# Patient Record
Sex: FEMALE | Race: WHITE | NOT HISPANIC OR LATINO | Employment: UNEMPLOYED | URBAN - METROPOLITAN AREA
[De-identification: names, ages, dates, MRNs, and addresses within clinical notes are randomized per-mention and may not be internally consistent; named-entity substitution may affect disease eponyms.]

---

## 2017-04-19 ENCOUNTER — APPOINTMENT (OUTPATIENT)
Dept: RADIOLOGY | Facility: MEDICAL CENTER | Age: 52
End: 2017-04-19
Attending: EMERGENCY MEDICINE
Payer: MEDICAID

## 2017-04-19 ENCOUNTER — HOSPITAL ENCOUNTER (OUTPATIENT)
Facility: MEDICAL CENTER | Age: 52
End: 2017-04-20
Attending: EMERGENCY MEDICINE | Admitting: ORTHOPAEDIC SURGERY
Payer: MEDICAID

## 2017-04-19 DIAGNOSIS — S52.372A CLOSED GALEAZZI'S FRACTURE OF LEFT RADIUS, INITIAL ENCOUNTER: ICD-10-CM

## 2017-04-19 PROCEDURE — G0378 HOSPITAL OBSERVATION PER HR: HCPCS

## 2017-04-19 PROCEDURE — 73110 X-RAY EXAM OF WRIST: CPT | Mod: LT

## 2017-04-19 PROCEDURE — A9270 NON-COVERED ITEM OR SERVICE: HCPCS | Performed by: ORTHOPAEDIC SURGERY

## 2017-04-19 PROCEDURE — 96375 TX/PRO/DX INJ NEW DRUG ADDON: CPT

## 2017-04-19 PROCEDURE — 700111 HCHG RX REV CODE 636 W/ 250 OVERRIDE (IP): Performed by: EMERGENCY MEDICINE

## 2017-04-19 PROCEDURE — 73090 X-RAY EXAM OF FOREARM: CPT | Mod: LT

## 2017-04-19 PROCEDURE — 96374 THER/PROPH/DIAG INJ IV PUSH: CPT

## 2017-04-19 PROCEDURE — 99285 EMERGENCY DEPT VISIT HI MDM: CPT

## 2017-04-19 PROCEDURE — 73130 X-RAY EXAM OF HAND: CPT | Mod: LT

## 2017-04-19 PROCEDURE — 700102 HCHG RX REV CODE 250 W/ 637 OVERRIDE(OP): Performed by: ORTHOPAEDIC SURGERY

## 2017-04-19 PROCEDURE — 73080 X-RAY EXAM OF ELBOW: CPT | Mod: LT

## 2017-04-19 RX ORDER — OXYCODONE HYDROCHLORIDE 5 MG/1
5 TABLET ORAL
Status: DISCONTINUED | OUTPATIENT
Start: 2017-04-19 | End: 2017-04-20 | Stop reason: HOSPADM

## 2017-04-19 RX ORDER — ACETAMINOPHEN 500 MG
1000 TABLET ORAL EVERY 6 HOURS
Status: DISCONTINUED | OUTPATIENT
Start: 2017-04-19 | End: 2017-04-20

## 2017-04-19 RX ORDER — OXYCODONE HYDROCHLORIDE 10 MG/1
10 TABLET ORAL
Status: DISCONTINUED | OUTPATIENT
Start: 2017-04-19 | End: 2017-04-20 | Stop reason: HOSPADM

## 2017-04-19 RX ORDER — MORPHINE SULFATE 4 MG/ML
4 INJECTION, SOLUTION INTRAMUSCULAR; INTRAVENOUS ONCE
Status: COMPLETED | OUTPATIENT
Start: 2017-04-19 | End: 2017-04-19

## 2017-04-19 RX ORDER — ONDANSETRON 2 MG/ML
4 INJECTION INTRAMUSCULAR; INTRAVENOUS ONCE
Status: COMPLETED | OUTPATIENT
Start: 2017-04-19 | End: 2017-04-19

## 2017-04-19 RX ADMIN — ONDANSETRON 4 MG: 2 INJECTION INTRAMUSCULAR; INTRAVENOUS at 19:59

## 2017-04-19 RX ADMIN — MORPHINE SULFATE 4 MG: 4 INJECTION INTRAVENOUS at 19:59

## 2017-04-19 RX ADMIN — HYDROMORPHONE HYDROCHLORIDE 1 MG: 1 INJECTION, SOLUTION INTRAMUSCULAR; INTRAVENOUS; SUBCUTANEOUS at 21:43

## 2017-04-19 RX ADMIN — ACETAMINOPHEN 1000 MG: 500 TABLET, FILM COATED ORAL at 22:01

## 2017-04-19 ASSESSMENT — PAIN SCALES - GENERAL
PAINLEVEL_OUTOF10: 8
PAINLEVEL_OUTOF10: 4

## 2017-04-19 ASSESSMENT — LIFESTYLE VARIABLES: DO YOU DRINK ALCOHOL: NO

## 2017-04-19 NOTE — IP AVS SNAPSHOT
" Home Care Instructions                                                                                                                  Name:Doris Tate  Medical Record Number:[id number]  CSN: [id number]    YOB: 1965   Age: 52 y.o.  Sex: female  HT:1.727 m (5' 7.99\") WT: 77 kg (169 lb 12.1 oz)          Admit Date: 4/19/2017     Discharge Date:   Today's Date: 4/20/2017  Attending Doctor:  Cortez Mccall M.D.                  Allergies:  Review of patient's allergies indicates no known allergies.            Discharge Instructions       Discharge Instructions    Discharged to home by car with friend. Discharged via wheelchair, hospital escort: Yes.  Special equipment needed: Not Applicable    Be sure to schedule a follow-up appointment with your primary care doctor or any specialists as instructed.     Discharge Plan:   Diet Plan: Discussed  Activity Level: Discussed  Smoking Cessation Offered: Patient Refused  Confirmed Follow up Appointment: Patient to Call and Schedule Appointment  Confirmed Symptoms Management: Discussed  Medication Reconciliation Updated: Yes  Influenza Vaccine Indication: Patient Refuses    I understand that a diet low in cholesterol, fat, and sodium is recommended for good health. Unless I have been given specific instructions below for another diet, I accept this instruction as my diet prescription.   Other diet: regular      Special Instructions: Discharge instructions for the Orthopedic Patient    Follow up with Primary Care Physician within 2 weeks of discharge to home, regarding:  Review of medications and diagnostic testing.  Surveillance for medical complications.  Workup and treatment of osteoporosis, if appropriate.     -Is this a Joint Replacement patient? No    -Is this patient being discharged with medication to prevent blood clots?  No    · Is patient discharged on Warfarin / Coumadin?   No     · Is patient Post Blood Transfusion?  No    Depression / Suicide Risk    As " you are discharged from this [facility] facility, it is important to learn how to keep safe from harming yourself.    Recognize the warning signs:  · Abrupt changes in personality, positive or negative- including increase in energy   · Giving away possessions  · Change in eating patterns- significant weight changes-  positive or negative  · Change in sleeping patterns- unable to sleep or sleeping all the time   · Unwillingness or inability to communicate  · Depression  · Unusual sadness, discouragement and loneliness  · Talk of wanting to die  · Neglect of personal appearance   · Rebelliousness- reckless behavior  · Withdrawal from people/activities they love  · Confusion- inability to concentrate     If you or a loved one observes any of these behaviors or has concerns about self-harm, here's what you can do:  · Talk about it- your feelings and reasons for harming yourself  · Remove any means that you might use to hurt yourself (examples: pills, rope, extension cords, firearm)  · Get professional help from the community (Mental Health, Substance Abuse, psychological counseling)  · Do not be alone:Call your Safe Contact- someone whom you trust who will be there for you.  · Call your local CRISIS HOTLINE 181-0608 or 112-699-3221  · Call your local Children's Mobile Crisis Response Team [place] (526) 247-5182 or www.[employer]  · Call the toll free National Suicide Prevention Hotlines   · National Suicide Prevention Lifeline 401-050-ZGFE (9162)  · National Hope Line Network 800-SUICIDE (278-3686)        Follow-up Information     1. Follow up with Cortez Mccall M.D.. Schedule an appointment as soon as possible for a visit on 5/1/2017.    Specialty:  Orthopaedics    Why:  For suture removal    Contact information    [address]  [address]  [place] [address]  [phone]           Discharge Medication Instructions:    Below are the medications your physician expects you to take upon discharge:    Review  all your home medications and newly ordered medications with your doctor and/or pharmacist. Follow medication instructions as directed by your doctor and/or pharmacist.    Please keep your medication list with you and share with your physician.               Medication List      START taking these medications        Instructions    Morning Afternoon Evening Bedtime    oxycodone immediate-release 5 MG Tabs   Last time this was given:  5 mg on 4/20/2017  4:39 AM   Commonly known as:  ROXICODONE        Take 1-2 Tabs by mouth every four hours as needed (Moderate Pain (NRS Pain Scale 4-6; CPOT Pain Scale 3-5)).   Dose:  5-10 mg                          CONTINUE taking these medications        Instructions    Morning Afternoon Evening Bedtime    ibuprofen 200 MG Tabs   Commonly known as:  MOTRIN        Take 400 mg by mouth every 6 hours as needed for Mild Pain. Indications: over the counter   Dose:  400 mg                             Where to Get Your Medications      You can get these medications from any pharmacy     Bring a paper prescription for each of these medications    - oxycodone immediate-release 5 MG Tabs            Instructions           Diet / Nutrition:    Follow any diet instructions given to you by your doctor or the dietician, including how much salt (sodium) you are allowed each day.    If you are overweight, talk to your doctor about a weight reduction plan.    Activity:    Remain physically active following your doctor's instructions about exercise and activity.    Rest often.     Any time you become even a little tired or short of breath, SIT DOWN and rest.    Worsening Symptoms:    Report any of the following signs and symptoms to the doctor's office immediately:    *Pain of jaw, arm, or neck  *Chest pain not relieved by medication                               *Dizziness or loss of consciousness  *Difficulty breathing even when at rest   *More tired than usual                                          *Bleeding drainage or swelling of surgical site  *Swelling of feet, ankles, legs or stomach                 *Fever (>100ºF)  *Pink or blood tinged sputum  *Weight gain (3lbs/day or 5lbs /week)           *Shock from internal defibrillator (if applicable)  *Palpitations or irregular heartbeats                *Cool and/or numb extremities    Stroke Awareness    Common Risk Factors for Stroke include:    Age  Atrial Fibrillation  Carotid Artery Stenosis  Diabetes Mellitus  Excessive alcohol consumption  High blood pressure  Overweight   Physical inactivity  Smoking    Warning signs and symptoms of a stroke include:    *Sudden numbness or weakness of the face, arm or leg (especially on one side of the body).  *Sudden confusion, trouble speaking or understanding.  *Sudden trouble seeing in one or both eyes.  *Sudden trouble walking, dizziness, loss of balance or coordination.Sudden severe headache with no known cause.    It is very important to get treatment quickly when a stroke occurs. If you experience any of the above warning signs, call 911 immediately.                   Disclaimer         Quit Smoking / Tobacco Use:    I understand the use of any tobacco products increases my chance of suffering from future heart disease or stroke and could cause other illnesses which may shorten my life. Quitting the use of tobacco products is the single most important thing I can do to improve my health. For further information on smoking / tobacco cessation call a Toll Free Quit Line at 1-665.707.3025 (*National Cancer [place]) or 1-195.168.4725 (American Lung Association) or you can access the web based program at www.lungusa.org.    [place] Tobacco Users Help Line:  (830) 118-7735       Toll Free: 1-421.970.2138  Quit Tobacco Program [facility] (634)985-3644    Crisis Hotline:    [place] Crisis Hotline:  9-320-HBNBTKL or 1-682.135.3749    [place] Crisis Hotline:    1-182.673.2689 or  999.855.2532    Discharge Survey:   Thank you for choosing [facility]. We hope we did everything we could to make your hospital stay a pleasant one. You may be receiving a phone survey and we would appreciate your time and participation in answering the questions. Your input is very valuable to us in our efforts to improve our service to our patients and their families.        My signature on this form indicates that:    1. I have reviewed and understand the above information.  2. My questions regarding this information have been answered to my satisfaction.  3. I have formulated a plan with my discharge nurse to obtain my prescribed medications for home.                  Disclaimer         __________________________________                     __________       ________                       Patient Signature                                                 Date                    Time

## 2017-04-19 NOTE — IP AVS SNAPSHOT
" <p align=\"LEFT\"><IMG SRC=\"//[email or web address]/blob$/[email or web address]\" alt=\"Image\" WIDTH=\"50%\" HEIGHT=\"200\" BORDER=\"\"></p>                   Name:Doris Tate  Medical Record Number:[id number]  CSN: [id number]    YOB: 1965   Age: 52 y.o.  Sex: female  HT:1.727 m (5' 7.99\") WT: 77 kg (169 lb 12.1 oz)          Admit Date: 4/19/2017     Discharge Date:   Today's Date: 4/20/2017  Attending Doctor:  Cortez Mccall M.D.                  Allergies:  Review of patient's allergies indicates no known allergies.          Follow-up Information     1. Follow up with Cortez Mccall M.D.. Schedule an appointment as soon as possible for a visit on 5/1/2017.    Specialty:  Orthopaedics    Why:  For suture removal    Contact information    [address]  [address]  [facility] [address]  [phone]           Medication List      Take these Medications        Instructions    ibuprofen 200 MG Tabs   Commonly known as:  MOTRIN    Take 400 mg by mouth every 6 hours as needed for Mild Pain. Indications: over the counter   Dose:  400 mg       oxycodone immediate-release 5 MG Tabs   Commonly known as:  ROXICODONE    Take 1-2 Tabs by mouth every four hours as needed (Moderate Pain (NRS Pain Scale 4-6; CPOT Pain Scale 3-5)).   Dose:  5-10 mg         "

## 2017-04-19 NOTE — IP AVS SNAPSHOT
[employer] Access Code: [email or web address]  Expires: 5/20/2017  6:17 PM    Your email address is not on file at [facility].  Email Addresses are required for you to sign up for [employer], please contact [phone] to verify your personal information and to provide your email address prior to attempting to register for [employer].    Doris [address]  [address]  [place] [address]    Mel[employer]  A secure, online tool to manage your health information     [facility]’s Mel[employer] Discovery® is a secure, online tool that connects you to your personalized health information from the privacy of your home -- day or night - making it very easy for you to manage your healthcare. Once the activation process is completed, you can even access your medical information using the Mel[employer] Discovery omer, which is available for free in the Apple Omer store or Google Play store.     To learn more about Melior Discovery, visit [email or web address]    There are two levels of access available (as shown below):   My Chart Features  [facility] [place] [facility] Primary Care Doctor [facility]  Specialists [facility]  Urgent  Care Non-[facility] Primary Care Doctor   Email your healthcare team securely and privately 24/7 X X X    Manage appointments: schedule your next appointment; view details of past/upcoming appointments X      Request prescription refills. X      View recent personal medical records, including lab and immunizations X X X X   View health record, including health history, allergies, medications X X X X   Read reports about your outpatient visits, procedures, consult and ER notes X X X X   See your discharge summary, which is a recap of your hospital and/or ER visit that includes your diagnosis, lab results, and care plan X X  X     How to register for [employer]:  Once your e-mail address has been verified, follow the following steps to sign up for Mel[employer] Discovery.     1. Go to  [email or web address]  2. Click on the Sign Up Now box, which takes you to the New Member Sign Up page. You will need to  provide the following information:  a. Enter your [employer] Access Code exactly as it appears at the top of this page. (You will not need to use this code after you’ve completed the sign-up process. If you do not sign up before the expiration date, you must request a new code.)   b. Enter your date of birth.   c. Enter your home email address.   d. Click Submit, and follow the next screen’s instructions.  3. Create a [employer] ID. This will be your [employer] login ID and cannot be changed, so think of one that is secure and easy to remember.  4. Create a [employer] password. You can change your password at any time.  5. Enter your Password Reset Question and Answer. This can be used at a later time if you forget your password.   6. Enter your e-mail address. This allows you to receive e-mail notifications when new information is available in [employer].  7. Click Sign Up. You can now view your health information.    For assistance activating your [employer] account, call [phone]

## 2017-04-19 NOTE — IP AVS SNAPSHOT
4/20/2017    Doris Tate  [address]  [place] [address]    Dear Doris:    [facility] wants to ensure your discharge home is safe and you or your loved ones have had all of your questions answered regarding your care after you leave the hospital.    Below is a list of resources and contact information should you have any questions regarding your hospital stay, follow-up instructions, or active medical symptoms.    Questions or Concerns Regarding… Contact   Medical Questions Related to Your Discharge  (7 days a week, 8am-5pm) Contact a Nurse Care Coordinator   [phone]   Medical Questions Not Related to Your Discharge  (24 hours a day / 7 days a week)  Contact the Nurse Health Line   [phone]    Medications or Discharge Instructions Refer to your discharge packet   or contact your [facility] Primary Care Provider   [phone]   Follow-up Appointment(s) Schedule your appointment via [employer]   or contact Scheduling [phone]   Billing Review your statement via [employer]  or contact Billing [phone]   Medical Records Review your records via [employer]   or contact Medical Records [phone]     You may receive a telephone call within two days of discharge. This call is to make certain you understand your discharge instructions and have the opportunity to have any questions answered. You can also easily access your medical information, test results and upcoming appointments via the [employer] free online health management tool. You can learn more and sign up at [place]/[employer]. For assistance setting up your [employer] account, please call [phone].    Once again, we want to ensure your discharge home is safe and that you have a clear understanding of any next steps in your care. If you have any questions or concerns, please do not hesitate to contact us, we are here for you. Thank you for choosing [facility] for your healthcare needs.    Sincerely,    Your [facility] Healthcare Team

## 2017-04-20 ENCOUNTER — APPOINTMENT (OUTPATIENT)
Dept: RADIOLOGY | Facility: MEDICAL CENTER | Age: 52
End: 2017-04-20
Attending: ORTHOPAEDIC SURGERY
Payer: MEDICAID

## 2017-04-20 VITALS
HEART RATE: 73 BPM | TEMPERATURE: 98.6 F | WEIGHT: 169.75 LBS | OXYGEN SATURATION: 95 % | DIASTOLIC BLOOD PRESSURE: 93 MMHG | SYSTOLIC BLOOD PRESSURE: 133 MMHG | RESPIRATION RATE: 16 BRPM | BODY MASS INDEX: 25.73 KG/M2 | HEIGHT: 68 IN

## 2017-04-20 LAB
HCG UR QL: NEGATIVE
SP GR UR REFRACTOMETRY: 1.03

## 2017-04-20 PROCEDURE — 501838 HCHG SUTURE GENERAL: Performed by: ORTHOPAEDIC SURGERY

## 2017-04-20 PROCEDURE — 110371 HCHG SHELL REV 272: Performed by: ORTHOPAEDIC SURGERY

## 2017-04-20 PROCEDURE — 700102 HCHG RX REV CODE 250 W/ 637 OVERRIDE(OP)

## 2017-04-20 PROCEDURE — 500881 HCHG PACK, EXTREMITY: Performed by: ORTHOPAEDIC SURGERY

## 2017-04-20 PROCEDURE — 160048 HCHG OR STATISTICAL LEVEL 1-5: Performed by: ORTHOPAEDIC SURGERY

## 2017-04-20 PROCEDURE — 73090 X-RAY EXAM OF FOREARM: CPT | Mod: LT

## 2017-04-20 PROCEDURE — A9270 NON-COVERED ITEM OR SERVICE: HCPCS

## 2017-04-20 PROCEDURE — 160039 HCHG SURGERY MINUTES - EA ADDL 1 MIN LEVEL 3: Performed by: ORTHOPAEDIC SURGERY

## 2017-04-20 PROCEDURE — 700101 HCHG RX REV CODE 250

## 2017-04-20 PROCEDURE — 81025 URINE PREGNANCY TEST: CPT

## 2017-04-20 PROCEDURE — A4606 OXYGEN PROBE USED W OXIMETER: HCPCS | Performed by: ORTHOPAEDIC SURGERY

## 2017-04-20 PROCEDURE — 96375 TX/PRO/DX INJ NEW DRUG ADDON: CPT

## 2017-04-20 PROCEDURE — A9270 NON-COVERED ITEM OR SERVICE: HCPCS | Performed by: ORTHOPAEDIC SURGERY

## 2017-04-20 PROCEDURE — 700111 HCHG RX REV CODE 636 W/ 250 OVERRIDE (IP): Performed by: ORTHOPAEDIC SURGERY

## 2017-04-20 PROCEDURE — 500053 HCHG BANDAGE, ELASTIC 4: Performed by: ORTHOPAEDIC SURGERY

## 2017-04-20 PROCEDURE — 502240 HCHG MISC OR SUPPLY RC 0272: Performed by: ORTHOPAEDIC SURGERY

## 2017-04-20 PROCEDURE — 110382 HCHG SHELL REV 271: Performed by: ORTHOPAEDIC SURGERY

## 2017-04-20 PROCEDURE — 160036 HCHG PACU - EA ADDL 30 MINS PHASE I: Performed by: ORTHOPAEDIC SURGERY

## 2017-04-20 PROCEDURE — 700111 HCHG RX REV CODE 636 W/ 250 OVERRIDE (IP)

## 2017-04-20 PROCEDURE — 502000 HCHG MISC OR IMPLANTS RC 0278: Performed by: ORTHOPAEDIC SURGERY

## 2017-04-20 PROCEDURE — 160002 HCHG RECOVERY MINUTES (STAT): Performed by: ORTHOPAEDIC SURGERY

## 2017-04-20 PROCEDURE — 700102 HCHG RX REV CODE 250 W/ 637 OVERRIDE(OP): Performed by: ORTHOPAEDIC SURGERY

## 2017-04-20 PROCEDURE — 160035 HCHG PACU - 1ST 60 MINS PHASE I: Performed by: ORTHOPAEDIC SURGERY

## 2017-04-20 PROCEDURE — 96376 TX/PRO/DX INJ SAME DRUG ADON: CPT

## 2017-04-20 PROCEDURE — G0378 HOSPITAL OBSERVATION PER HR: HCPCS

## 2017-04-20 PROCEDURE — 160028 HCHG SURGERY MINUTES - 1ST 30 MINS LEVEL 3: Performed by: ORTHOPAEDIC SURGERY

## 2017-04-20 PROCEDURE — 160009 HCHG ANES TIME/MIN: Performed by: ORTHOPAEDIC SURGERY

## 2017-04-20 DEVICE — IMPLANTABLE DEVICE: Type: IMPLANTABLE DEVICE | Status: FUNCTIONAL

## 2017-04-20 DEVICE — SCREW CORTEX SELF-TAPPING PERI-LOC T20 3.5MM X 12MM (1TX8+1TX8=16): Type: IMPLANTABLE DEVICE | Status: FUNCTIONAL

## 2017-04-20 DEVICE — SCREW CORTEX SELF-TAPPING PERI-LOC T20 3.5MM X 14MM (1TX8+1TX8=16): Type: IMPLANTABLE DEVICE | Status: FUNCTIONAL

## 2017-04-20 RX ORDER — DEXAMETHASONE SODIUM PHOSPHATE 4 MG/ML
10 INJECTION, SOLUTION INTRA-ARTICULAR; INTRALESIONAL; INTRAMUSCULAR; INTRAVENOUS; SOFT TISSUE ONCE
Status: DISCONTINUED | OUTPATIENT
Start: 2017-04-21 | End: 2017-04-20 | Stop reason: HOSPADM

## 2017-04-20 RX ORDER — CELECOXIB 200 MG/1
200 CAPSULE ORAL 2 TIMES DAILY WITH MEALS
Status: DISCONTINUED | OUTPATIENT
Start: 2017-04-21 | End: 2017-04-20 | Stop reason: HOSPADM

## 2017-04-20 RX ORDER — ACETAMINOPHEN 500 MG
1000 TABLET ORAL EVERY 6 HOURS
Status: DISCONTINUED | OUTPATIENT
Start: 2017-04-20 | End: 2017-04-20 | Stop reason: HOSPADM

## 2017-04-20 RX ORDER — OXYCODONE HCL 5 MG/5 ML
SOLUTION, ORAL ORAL
Status: COMPLETED
Start: 2017-04-20 | End: 2017-04-20

## 2017-04-20 RX ORDER — BUPIVACAINE HYDROCHLORIDE AND EPINEPHRINE 5; 5 MG/ML; UG/ML
INJECTION, SOLUTION EPIDURAL; INTRACAUDAL; PERINEURAL
Status: DISCONTINUED | OUTPATIENT
Start: 2017-04-20 | End: 2017-04-20 | Stop reason: HOSPADM

## 2017-04-20 RX ORDER — MEPERIDINE HYDROCHLORIDE 25 MG/ML
INJECTION INTRAMUSCULAR; INTRAVENOUS; SUBCUTANEOUS
Status: COMPLETED
Start: 2017-04-20 | End: 2017-04-20

## 2017-04-20 RX ORDER — SODIUM CHLORIDE, SODIUM LACTATE, POTASSIUM CHLORIDE, CALCIUM CHLORIDE 600; 310; 30; 20 MG/100ML; MG/100ML; MG/100ML; MG/100ML
1000 INJECTION, SOLUTION INTRAVENOUS
Status: COMPLETED | OUTPATIENT
Start: 2017-04-20 | End: 2017-04-20

## 2017-04-20 RX ORDER — OXYCODONE HYDROCHLORIDE 5 MG/1
5-10 TABLET ORAL EVERY 4 HOURS PRN
Qty: 60 TAB | Refills: 0 | Status: SHIPPED | OUTPATIENT
Start: 2017-04-20 | End: 2023-10-03

## 2017-04-20 RX ORDER — MAGNESIUM HYDROXIDE 1200 MG/15ML
LIQUID ORAL
Status: DISCONTINUED | OUTPATIENT
Start: 2017-04-20 | End: 2017-04-20 | Stop reason: HOSPADM

## 2017-04-20 RX ORDER — KETOROLAC TROMETHAMINE 30 MG/ML
30 INJECTION, SOLUTION INTRAMUSCULAR; INTRAVENOUS EVERY 6 HOURS
Status: DISCONTINUED | OUTPATIENT
Start: 2017-04-20 | End: 2017-04-20 | Stop reason: HOSPADM

## 2017-04-20 RX ORDER — IBUPROFEN 200 MG
400 TABLET ORAL EVERY 6 HOURS PRN
COMMUNITY
End: 2023-12-08

## 2017-04-20 RX ADMIN — KETOROLAC TROMETHAMINE 30 MG: 30 INJECTION, SOLUTION INTRAMUSCULAR at 12:42

## 2017-04-20 RX ADMIN — KETOROLAC TROMETHAMINE 30 MG: 30 INJECTION, SOLUTION INTRAMUSCULAR at 17:48

## 2017-04-20 RX ADMIN — ACETAMINOPHEN 1000 MG: 500 TABLET, FILM COATED ORAL at 17:48

## 2017-04-20 RX ADMIN — OXYCODONE HYDROCHLORIDE 5 MG: 5 TABLET ORAL at 04:39

## 2017-04-20 RX ADMIN — FENTANYL CITRATE 50 MCG: 50 INJECTION, SOLUTION INTRAMUSCULAR; INTRAVENOUS at 11:43

## 2017-04-20 RX ADMIN — OXYCODONE HYDROCHLORIDE 10 MG: 5 SOLUTION ORAL at 11:40

## 2017-04-20 RX ADMIN — ACETAMINOPHEN 1000 MG: 500 TABLET, FILM COATED ORAL at 12:41

## 2017-04-20 RX ADMIN — ACETAMINOPHEN 1000 MG: 500 TABLET, FILM COATED ORAL at 04:38

## 2017-04-20 RX ADMIN — MEPERIDINE HYDROCHLORIDE 25 MG: 25 INJECTION INTRAMUSCULAR; INTRAVENOUS; SUBCUTANEOUS at 11:16

## 2017-04-20 RX ADMIN — OXYCODONE HYDROCHLORIDE 5 MG: 5 TABLET ORAL at 00:21

## 2017-04-20 RX ADMIN — FENTANYL CITRATE 50 MCG: 50 INJECTION, SOLUTION INTRAMUSCULAR; INTRAVENOUS at 11:51

## 2017-04-20 RX ADMIN — SODIUM CHLORIDE, SODIUM LACTATE, POTASSIUM CHLORIDE, CALCIUM CHLORIDE 1000 ML: 600; 310; 30; 20 INJECTION, SOLUTION INTRAVENOUS at 10:00

## 2017-04-20 ASSESSMENT — PAIN SCALES - GENERAL
PAINLEVEL_OUTOF10: 5
PAINLEVEL_OUTOF10: 8
PAINLEVEL_OUTOF10: ASSUMED PAIN PRESENT
PAINLEVEL_OUTOF10: 0
PAINLEVEL_OUTOF10: 6
PAINLEVEL_OUTOF10: 8
PAINLEVEL_OUTOF10: 5
PAINLEVEL_OUTOF10: 0
PAINLEVEL_OUTOF10: 0
PAINLEVEL_OUTOF10: 8
PAINLEVEL_OUTOF10: 5

## 2017-04-20 ASSESSMENT — LIFESTYLE VARIABLES
HOW MANY TIMES IN THE PAST YEAR HAVE YOU HAD 5 OR MORE DRINKS IN A DAY: 30
TOTAL SCORE: 0
EVER_SMOKED: YES
AVERAGE NUMBER OF DAYS PER WEEK YOU HAVE A DRINK CONTAINING ALCOHOL: 4
HAVE PEOPLE ANNOYED YOU BY CRITICIZING YOUR DRINKING: NO
TOTAL SCORE: 0
ON A TYPICAL DAY WHEN YOU DRINK ALCOHOL HOW MANY DRINKS DO YOU HAVE: 6
ALCOHOL_USE: YES
HAVE YOU EVER FELT YOU SHOULD CUT DOWN ON YOUR DRINKING: NO
EVER FELT BAD OR GUILTY ABOUT YOUR DRINKING: NO
TOTAL SCORE: 0
EVER_SMOKED: YES
EVER HAD A DRINK FIRST THING IN THE MORNING TO STEADY YOUR NERVES TO GET RID OF A HANGOVER: NO
CONSUMPTION TOTAL: POSITIVE

## 2017-04-20 ASSESSMENT — COPD QUESTIONNAIRES
HAVE YOU SMOKED AT LEAST 100 CIGARETTES IN YOUR ENTIRE LIFE: YES
COPD SCREENING SCORE: 3
DO YOU EVER COUGH UP ANY MUCUS OR PHLEGM?: NO/ONLY WITH OCCASIONAL COLDS OR INFECTIONS
DURING THE PAST 4 WEEKS HOW MUCH DID YOU FEEL SHORT OF BREATH: NONE/LITTLE OF THE TIME
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
## 2017-04-20 NOTE — ED NOTES
[facility], all belongings accounted for.

## 2017-04-20 NOTE — OR SURGEON
Immediate Post-Operative Note      PreOp Diagnosis: Left radius Galeazzi fracture    PostOp Diagnosis: Same    Procedure(s):  ORIF L radius fracture    Surgeon(s):  LAUREANO Thornton M.D.    Anesthesiologist/Type of Anesthesia:  Anesthesiologist: Nikolay Storm M.D./General    Surgical Staff:  Circulator: Viral Stoddard R.N.  Scrub Person: Aziza Stevens Burney: Thu Koch R.N.  Radiology Technologist: Jose Alberto Flores    Specimen: None    Estimated Blood Loss: Minimal    Findings: Tourniquet time 30 mins @ 250 mmHg    Complications: None        4/20/2017 11:00 AM Cortez Mccall
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
## 2017-04-20 NOTE — PROGRESS NOTES
Requested orders for consent APRN declined stating it would be taken care of in morning. Contacted pre-op to determine appropriate labs. Notified pre-op of lack of H & P.

## 2017-04-20 NOTE — DISCHARGE SUMMARY
Date: 4/20/2017    Admission Diagnosis: Left radius Galeazzi fracture    Discharge Diagnosis: Left radius Galeazzi fracture    Procedures: Left radius ORIF    Hospital Course: The patient was taken to the operating room with Dr. Mccall and underwent the procedure listed above.  They tolerated the procedure well, suffering no complications, and were subsequently admitted to the orthopedic floor.  The patient's hospital stay was brief and uncomplicated.  At the time of discharge the patient tolerated an oral diet, pain was controlled by oral medications, and they were making appropriate progress with physical therapy.    Follow-up:  The patient should make an appointment to follow-up with Dr. Mccall 10-14 days after surgery.    Discharge Instructions:    Activity:  The patient may perform activities of daily living with the operative extremity, but should not lift items heavier than a cup of coffee.    Wound Care:  The patient should keep the wound clean and dry.  The operative dressing should be removed in 48 hours, after which the patient may shower.  Daily dry gauze dressing changes should be performed until the wound is entirely dry, and may continue to be performed as desired for comfort.  Your wound has been closed with sutures, which will be removed 10-14 days after surgery.    Pain Control:  You have been prescribed a narcotic pain medication.  Narcotic pain medications have numerous side effects, including sedation, nausea, and constipation.  You may not drive if taking narcotic pain medications.  Over the counter stool softeners and laxatives should be used as needed to prevent constipation.  You should supplement your pain medications with over the counter medications such as Tylenol (acetaminophen).  Take these medications as directed by the packaging, but be aware that some narcotic pain medications contain acetaminophen, and you should not exceed 3000 mg of acetaminophen daily.  You should wean from your  narcotic medications as tolerated.     Medication Refills:  Pay attention to the quantity of medications - particularly pain medications - you have remaining.  Pain medications will not be refilled on weekends, so please plan accordingly.    Call the Doctor If:  You should contact the [facility] if you notice any of the following: fever > 101.5 degrees F, increased wound drainage, persistent wound drainage, increasing redness around the wound, increasing or uncontrolled pain, opening of the incision, decreased sensation in your fingers or discoloration of your fingers, or other concerns.  If you suffer a medical emergency (chest pain, stroke symptoms, etc), you should contact 911 or proceed to your nearest Emergency Room immediately.
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
## 2017-04-20 NOTE — PROGRESS NOTES
Left radius Galeazzi fracture.      Plan:  - Admit to ortho  - NPO after midnight  - To OR tomorrow for ORIF

## 2017-04-20 NOTE — ED NOTES
Pt. Updated on the plan of care to be admitted. Pt. Informed she has a room upstairs but that it is being cleaned. Will continue to monitor.
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
## 2017-04-20 NOTE — PROGRESS NOTES
Patient returned to room 338 from procedure a/a/o with no c/o at this time, VS stable, room air, left arm with soft dressing and ace wrap in place with arm elevated as ordered.  Mild tingling noted and pt is able to flex fingers and has full color and sensation.  Patient requesting full liquid diet, will notify kitchen.
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
## 2017-04-21 NOTE — DISCHARGE INSTRUCTIONS
Discharge Instructions    Discharged to home by car with friend. Discharged via wheelchair, hospital escort: Yes.  Special equipment needed: Not Applicable    Be sure to schedule a follow-up appointment with your primary care doctor or any specialists as instructed.     Discharge Plan:   Diet Plan: Discussed  Activity Level: Discussed  Smoking Cessation Offered: Patient Refused  Confirmed Follow up Appointment: Patient to Call and Schedule Appointment  Confirmed Symptoms Management: Discussed  Medication Reconciliation Updated: Yes  Influenza Vaccine Indication: Patient Refuses    I understand that a diet low in cholesterol, fat, and sodium is recommended for good health. Unless I have been given specific instructions below for another diet, I accept this instruction as my diet prescription.   Other diet: regular      Special Instructions: Discharge instructions for the Orthopedic Patient    Follow up with Primary Care Physician within 2 weeks of discharge to home, regarding:  Review of medications and diagnostic testing.  Surveillance for medical complications.  Workup and treatment of osteoporosis, if appropriate.     -Is this a Joint Replacement patient? No    -Is this patient being discharged with medication to prevent blood clots?  No    · Is patient discharged on Warfarin / Coumadin?   No     · Is patient Post Blood Transfusion?  No    Depression / Suicide Risk    As you are discharged from this [facility] facility, it is important to learn how to keep safe from harming yourself.    Recognize the warning signs:  · Abrupt changes in personality, positive or negative- including increase in energy   · Giving away possessions  · Change in eating patterns- significant weight changes-  positive or negative  · Change in sleeping patterns- unable to sleep or sleeping all the time   · Unwillingness or inability to communicate  · Depression  · Unusual sadness, discouragement and loneliness  · Talk of wanting to  die  · Neglect of personal appearance   · Rebelliousness- reckless behavior  · Withdrawal from people/activities they love  · Confusion- inability to concentrate     If you or a loved one observes any of these behaviors or has concerns about self-harm, here's what you can do:  · Talk about it- your feelings and reasons for harming yourself  · Remove any means that you might use to hurt yourself (examples: pills, rope, extension cords, firearm)  · Get professional help from the community (Mental Health, Substance Abuse, psychological counseling)  · Do not be alone:Call your Safe Contact- someone whom you trust who will be there for you.  · Call your local CRISIS HOTLINE 350-8524 or 853-519-0974  · Call your local Children's Mobile Crisis Response Team [place] (274) 980-5801 or www.[employer]  · Call the toll free National Suicide Prevention Hotlines   · National Suicide Prevention Lifeline 297-164-GAWT (3599)  · National Hope Line Network 800-SUICIDE (658-9630)

## 2017-07-03 ENCOUNTER — HOSPITAL ENCOUNTER (OUTPATIENT)
Dept: RADIOLOGY | Facility: MEDICAL CENTER | Age: 52
End: 2017-07-03
Attending: ORTHOPAEDIC SURGERY
Payer: MEDICAID

## 2017-07-03 DIAGNOSIS — M25.562 LEFT KNEE PAIN, UNSPECIFIED CHRONICITY: ICD-10-CM

## 2017-07-03 PROCEDURE — 73721 MRI JNT OF LWR EXTRE W/O DYE: CPT | Mod: LT

## 2017-09-15 ENCOUNTER — HOSPITAL ENCOUNTER (OUTPATIENT)
Dept: HOSPITAL 8 - STAR | Age: 52
Discharge: HOME | End: 2017-09-15
Attending: ORTHOPAEDIC SURGERY
Payer: MEDICAID

## 2017-09-15 VITALS — DIASTOLIC BLOOD PRESSURE: 98 MMHG | SYSTOLIC BLOOD PRESSURE: 166 MMHG

## 2017-09-15 DIAGNOSIS — Z02.9: Primary | ICD-10-CM

## 2017-09-19 ENCOUNTER — HOSPITAL ENCOUNTER (OUTPATIENT)
Dept: HOSPITAL 8 - OUT | Age: 52
End: 2017-09-19
Attending: ORTHOPAEDIC SURGERY
Payer: MEDICAID

## 2017-09-19 VITALS — WEIGHT: 169.54 LBS | HEIGHT: 68 IN | BODY MASS INDEX: 25.69 KG/M2

## 2017-09-19 DIAGNOSIS — M65.862: ICD-10-CM

## 2017-09-19 DIAGNOSIS — S83.412A: ICD-10-CM
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
## 2017-09-19 DIAGNOSIS — S83.512A: Primary | ICD-10-CM

## 2017-09-19 DIAGNOSIS — K21.9: ICD-10-CM
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
## 2017-09-19 PROCEDURE — 27407 REPAIR OF KNEE LIGAMENT: CPT

## 2017-09-19 PROCEDURE — 76000 FLUOROSCOPY <1 HR PHYS/QHP: CPT

## 2017-09-19 PROCEDURE — 73560 X-RAY EXAM OF KNEE 1 OR 2: CPT

## 2017-09-19 PROCEDURE — C1762 CONN TISS, HUMAN(INC FASCIA): HCPCS

## 2017-09-19 PROCEDURE — 27405 REPAIR OF KNEE LIGAMENT: CPT

## 2017-09-19 PROCEDURE — C1713 ANCHOR/SCREW BN/BN,TIS/BN: HCPCS

## 2017-09-19 RX ADMIN — HYDROMORPHONE HYDROCHLORIDE PRN MG: 1 INJECTION, SOLUTION INTRAMUSCULAR; INTRAVENOUS; SUBCUTANEOUS at 12:00

## 2017-09-19 RX ADMIN — HYDROMORPHONE HYDROCHLORIDE PRN MG: 1 INJECTION, SOLUTION INTRAMUSCULAR; INTRAVENOUS; SUBCUTANEOUS at 12:19

## 2017-09-19 RX ADMIN — HYDROMORPHONE HYDROCHLORIDE PRN MG: 1 INJECTION, SOLUTION INTRAMUSCULAR; INTRAVENOUS; SUBCUTANEOUS at 12:28

## 2017-09-19 RX ADMIN — HYDROMORPHONE HYDROCHLORIDE PRN MG: 1 INJECTION, SOLUTION INTRAMUSCULAR; INTRAVENOUS; SUBCUTANEOUS at 12:07

## 2019-05-13 ENCOUNTER — HOSPITAL ENCOUNTER (EMERGENCY)
Dept: HOSPITAL 8 - ED | Age: 54
Discharge: HOME | End: 2019-05-13
Payer: MEDICAID

## 2019-05-13 VITALS — WEIGHT: 182.98 LBS | HEIGHT: 68 IN | BODY MASS INDEX: 27.73 KG/M2

## 2019-05-13 VITALS — DIASTOLIC BLOOD PRESSURE: 84 MMHG | SYSTOLIC BLOOD PRESSURE: 119 MMHG
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
## 2019-05-13 DIAGNOSIS — S06.0X0A: Primary | ICD-10-CM

## 2019-05-13 DIAGNOSIS — S80.211A: ICD-10-CM

## 2019-05-13 DIAGNOSIS — S01.81XA: ICD-10-CM
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
## 2019-05-13 PROCEDURE — 70486 CT MAXILLOFACIAL W/O DYE: CPT

## 2019-05-13 PROCEDURE — 90471 IMMUNIZATION ADMIN: CPT

## 2019-05-13 PROCEDURE — 90715 TDAP VACCINE 7 YRS/> IM: CPT

## 2019-05-13 PROCEDURE — 70450 CT HEAD/BRAIN W/O DYE: CPT

## 2019-05-13 PROCEDURE — 72125 CT NECK SPINE W/O DYE: CPT

## 2019-05-13 PROCEDURE — 12013 RPR F/E/E/N/L/M 2.6-5.0 CM: CPT

## 2019-05-19 ENCOUNTER — HOSPITAL ENCOUNTER (EMERGENCY)
Dept: HOSPITAL 8 - ED | Age: 54
Discharge: HOME | End: 2019-05-19
Payer: MEDICAID

## 2019-05-19 VITALS — DIASTOLIC BLOOD PRESSURE: 101 MMHG | SYSTOLIC BLOOD PRESSURE: 185 MMHG

## 2019-05-19 VITALS — HEIGHT: 68 IN | BODY MASS INDEX: 28.17 KG/M2 | WEIGHT: 185.85 LBS
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
## 2019-05-19 DIAGNOSIS — S01.81XD: Primary | ICD-10-CM

## 2019-05-19 PROCEDURE — 99281 EMR DPT VST MAYX REQ PHY/QHP: CPT

## 2020-09-09 NOTE — CARE PLAN
Problem: Venous Thromboembolism (VTW)/Deep Vein Thrombosis (DVT) Prevention:  Goal: Patient will participate in Venous Thrombosis (VTE)/Deep Vein Thrombosis (DVT)Prevention Measures  Outcome: PROGRESSING AS EXPECTED  M.D. Order stating pharmacological prophylaxis not indicated         
Luis

## 2023-09-11 ENCOUNTER — OFFICE VISIT (OUTPATIENT)
Dept: URGENT CARE | Facility: CLINIC | Age: 58
End: 2023-09-11
Payer: MEDICAID

## 2023-09-11 VITALS
HEART RATE: 97 BPM | RESPIRATION RATE: 15 BRPM | DIASTOLIC BLOOD PRESSURE: 82 MMHG | WEIGHT: 200 LBS | OXYGEN SATURATION: 96 % | BODY MASS INDEX: 30.31 KG/M2 | SYSTOLIC BLOOD PRESSURE: 136 MMHG | HEIGHT: 68 IN | TEMPERATURE: 97.3 F

## 2023-09-11 DIAGNOSIS — J40 BRONCHITIS: ICD-10-CM

## 2023-09-11 PROCEDURE — 3079F DIAST BP 80-89 MM HG: CPT | Performed by: PHYSICIAN ASSISTANT

## 2023-09-11 PROCEDURE — 99204 OFFICE O/P NEW MOD 45 MIN: CPT | Performed by: PHYSICIAN ASSISTANT

## 2023-09-11 PROCEDURE — 3075F SYST BP GE 130 - 139MM HG: CPT | Performed by: PHYSICIAN ASSISTANT

## 2023-09-11 RX ORDER — PREDNISONE 20 MG/1
TABLET ORAL
Qty: 10 TABLET | Refills: 0 | Status: SHIPPED | OUTPATIENT
Start: 2023-09-11 | End: 2023-10-03

## 2023-09-11 RX ORDER — ALBUTEROL SULFATE 90 UG/1
2 AEROSOL, METERED RESPIRATORY (INHALATION) EVERY 6 HOURS PRN
Qty: 8.5 G | Refills: 0 | Status: SHIPPED | OUTPATIENT
Start: 2023-09-11 | End: 2023-12-08

## 2023-09-11 RX ORDER — BENZONATATE 100 MG/1
100 CAPSULE ORAL 3 TIMES DAILY PRN
Qty: 60 CAPSULE | Refills: 0 | Status: SHIPPED | OUTPATIENT
Start: 2023-09-11 | End: 2023-12-08

## 2023-09-11 ASSESSMENT — ENCOUNTER SYMPTOMS
WHEEZING: 0
COUGH: 1
CHILLS: 0
HEMOPTYSIS: 0
SPUTUM PRODUCTION: 0
SHORTNESS OF BREATH: 0
FEVER: 0

## 2023-09-11 NOTE — PROGRESS NOTES
Subjective:   Doris Tate is a 58 y.o. female who presents today with   Chief Complaint   Patient presents with    Cough       Cough  This is a new problem. The current episode started more than 1 month ago. The problem has been unchanged. The problem occurs every few minutes. The cough is Non-productive. Pertinent negatives include no chest pain, chills, fever, hemoptysis, shortness of breath or wheezing. She has tried nothing for the symptoms. The treatment provided no relief. Her past medical history is significant for bronchitis and pneumonia.       PMH:  has a past medical history of Compression fracture of L1 lumbar vertebra (HCC), Compression fracture of L2 lumbar vertebra (HCC), Cyst of ovary, left, GERD (gastroesophageal reflux disease), H/O: , History of appendectomy, and Hypertension.  MEDS:   Current Outpatient Medications:     benzonatate (TESSALON) 100 MG Cap, Take 1 Capsule by mouth 3 times a day as needed for Cough., Disp: 60 Capsule, Rfl: 0    predniSONE (DELTASONE) 20 MG Tab, Take 1 tab twice daily for 5 days., Disp: 10 Tablet, Rfl: 0    albuterol 108 (90 Base) MCG/ACT Aero Soln inhalation aerosol, Inhale 2 Puffs every 6 hours as needed for Shortness of Breath., Disp: 8.5 g, Rfl: 0    ibuprofen (MOTRIN) 200 MG Tab, Take 400 mg by mouth every 6 hours as needed for Mild Pain. Indications: over the counter, Disp: , Rfl:     oxycodone immediate-release (ROXICODONE) 5 MG Tab, Take 1-2 Tabs by mouth every four hours as needed (Moderate Pain (NRS Pain Scale 4-6; CPOT Pain Scale 3-5))., Disp: 60 Tab, Rfl: 0  ALLERGIES: No Known Allergies  SURGHX:   Past Surgical History:   Procedure Laterality Date    ORIF, WRIST Left 2017    Procedure: WRIST ORIF;  Surgeon: Cortez Mccall M.D.;  Location: SURGERY [facility];  Service:      SOCHX:  reports that she has been smoking. She has quit using smokeless tobacco. She reports current alcohol use. She reports that she does not use  "drugs.  FH: Reviewed with patient, not pertinent to this visit.       Review of Systems   Constitutional:  Negative for chills and fever.   Respiratory:  Positive for cough. Negative for hemoptysis, sputum production, shortness of breath and wheezing.    Cardiovascular:  Negative for chest pain.        Objective:   /82 (BP Location: Right arm, Patient Position: Sitting, BP Cuff Size: Adult long)   Pulse 97   Temp 36.3 °C (97.3 °F) (Temporal)   Resp 15   Ht 1.727 m (5' 8\")   Wt 90.7 kg (200 lb)   SpO2 96%   BMI 30.41 kg/m²   Physical Exam  Vitals and nursing note reviewed.   Constitutional:       General: She is not in acute distress.     Appearance: Normal appearance. She is well-developed. She is not ill-appearing or toxic-appearing.   HENT:      Head: Normocephalic and atraumatic.      Right Ear: Hearing normal.      Left Ear: Hearing normal.      Mouth/Throat:      Mouth: Mucous membranes are moist.      Pharynx: No oropharyngeal exudate or posterior oropharyngeal erythema.   Cardiovascular:      Rate and Rhythm: Normal rate and regular rhythm.      Heart sounds: Normal heart sounds.   Pulmonary:      Effort: Pulmonary effort is normal. No respiratory distress.      Breath sounds: Normal breath sounds. No stridor. No wheezing, rhonchi or rales.   Musculoskeletal:      Comments: Normal movement in all 4 extremities   Skin:     General: Skin is warm and dry.   Neurological:      Mental Status: She is alert.      Coordination: Coordination normal.   Psychiatric:         Mood and Affect: Mood normal.         Assessment/Plan:   Assessment    1. Bronchitis  - benzonatate (TESSALON) 100 MG Cap; Take 1 Capsule by mouth 3 times a day as needed for Cough.  Dispense: 60 Capsule; Refill: 0  - predniSONE (DELTASONE) 20 MG Tab; Take 1 tab twice daily for 5 days.  Dispense: 10 Tablet; Refill: 0  - albuterol 108 (90 Base) MCG/ACT Aero Soln inhalation aerosol; Inhale 2 Puffs every 6 hours as needed for Shortness of " Breath.  Dispense: 8.5 g; Refill: 0    Symptoms and presentation appear consistent with viral bronchitis and would recommend treating with steroids and inhaler.  No indication for x-ray at this time.  No indication for antibiotics.  Advised I would recommend her attempting to discontinue smoking especially while she is sick.    Differential diagnosis, natural history, supportive care, and indications for immediate follow-up discussed.   Patient given instructions and understanding of medications and treatment.    If not improving in 3-5 days, F/U with PCP or return to  if symptoms worsen.    Patient agreeable to plan.      Please note that this dictation was created using voice recognition software. I have made every reasonable attempt to correct obvious errors, but I expect that there are errors of grammar and possibly content that I did not discover before finalizing the note.    Doc Schaeffer PA-C

## 2023-09-30 SDOH — HEALTH STABILITY: PHYSICAL HEALTH: ON AVERAGE, HOW MANY MINUTES DO YOU ENGAGE IN EXERCISE AT THIS LEVEL?: 0 MIN

## 2023-09-30 SDOH — ECONOMIC STABILITY: TRANSPORTATION INSECURITY
IN THE PAST 12 MONTHS, HAS THE LACK OF TRANSPORTATION KEPT YOU FROM MEDICAL APPOINTMENTS OR FROM GETTING MEDICATIONS?: NO

## 2023-09-30 SDOH — ECONOMIC STABILITY: TRANSPORTATION INSECURITY
IN THE PAST 12 MONTHS, HAS LACK OF RELIABLE TRANSPORTATION KEPT YOU FROM MEDICAL APPOINTMENTS, MEETINGS, WORK OR FROM GETTING THINGS NEEDED FOR DAILY LIVING?: NO

## 2023-09-30 SDOH — ECONOMIC STABILITY: FOOD INSECURITY: WITHIN THE PAST 12 MONTHS, THE FOOD YOU BOUGHT JUST DIDN'T LAST AND YOU DIDN'T HAVE MONEY TO GET MORE.: NEVER TRUE

## 2023-09-30 SDOH — HEALTH STABILITY: PHYSICAL HEALTH: ON AVERAGE, HOW MANY DAYS PER WEEK DO YOU ENGAGE IN MODERATE TO STRENUOUS EXERCISE (LIKE A BRISK WALK)?: 0 DAYS

## 2023-09-30 SDOH — ECONOMIC STABILITY: FOOD INSECURITY: WITHIN THE PAST 12 MONTHS, YOU WORRIED THAT YOUR FOOD WOULD RUN OUT BEFORE YOU GOT MONEY TO BUY MORE.: NEVER TRUE

## 2023-09-30 SDOH — ECONOMIC STABILITY: HOUSING INSECURITY
IN THE LAST 12 MONTHS, WAS THERE A TIME WHEN YOU DID NOT HAVE A STEADY PLACE TO SLEEP OR SLEPT IN A SHELTER (INCLUDING NOW)?: NO

## 2023-09-30 SDOH — ECONOMIC STABILITY: TRANSPORTATION INSECURITY
IN THE PAST 12 MONTHS, HAS LACK OF TRANSPORTATION KEPT YOU FROM MEETINGS, WORK, OR FROM GETTING THINGS NEEDED FOR DAILY LIVING?: NO

## 2023-09-30 SDOH — ECONOMIC STABILITY: HOUSING INSECURITY: IN THE LAST 12 MONTHS, HOW MANY PLACES HAVE YOU LIVED?: 1

## 2023-09-30 SDOH — ECONOMIC STABILITY: INCOME INSECURITY: HOW HARD IS IT FOR YOU TO PAY FOR THE VERY BASICS LIKE FOOD, HOUSING, MEDICAL CARE, AND HEATING?: NOT HARD AT ALL

## 2023-09-30 SDOH — HEALTH STABILITY: MENTAL HEALTH
STRESS IS WHEN SOMEONE FEELS TENSE, NERVOUS, ANXIOUS, OR CAN'T SLEEP AT NIGHT BECAUSE THEIR MIND IS TROUBLED. HOW STRESSED ARE YOU?: PATIENT DECLINED

## 2023-09-30 SDOH — ECONOMIC STABILITY: HOUSING INSECURITY

## 2023-09-30 ASSESSMENT — SOCIAL DETERMINANTS OF HEALTH (SDOH)
HOW MANY DRINKS CONTAINING ALCOHOL DO YOU HAVE ON A TYPICAL DAY WHEN YOU ARE DRINKING: 3 OR 4
HOW OFTEN DO YOU ATTEND CHURCH OR RELIGIOUS SERVICES?: NEVER
HOW OFTEN DO YOU GET TOGETHER WITH FRIENDS OR RELATIVES?: PATIENT DECLINED
ARE YOU MARRIED, WIDOWED, DIVORCED, SEPARATED, NEVER MARRIED, OR LIVING WITH A PARTNER?: LIVING WITH PARTNER
IN A TYPICAL WEEK, HOW MANY TIMES DO YOU TALK ON THE PHONE WITH FAMILY, FRIENDS, OR NEIGHBORS?: ONCE A WEEK
HOW OFTEN DO YOU ATTENT MEETINGS OF THE CLUB OR ORGANIZATION YOU BELONG TO?: NEVER
HOW OFTEN DO YOU HAVE A DRINK CONTAINING ALCOHOL: 2-3 TIMES A WEEK
HOW OFTEN DO YOU ATTENT MEETINGS OF THE CLUB OR ORGANIZATION YOU BELONG TO?: NEVER
HOW HARD IS IT FOR YOU TO PAY FOR THE VERY BASICS LIKE FOOD, HOUSING, MEDICAL CARE, AND HEATING?: NOT HARD AT ALL
HOW OFTEN DO YOU HAVE SIX OR MORE DRINKS ON ONE OCCASION: NEVER
DO YOU BELONG TO ANY CLUBS OR ORGANIZATIONS SUCH AS CHURCH GROUPS UNIONS, FRATERNAL OR ATHLETIC GROUPS, OR SCHOOL GROUPS?: NO
IN A TYPICAL WEEK, HOW MANY TIMES DO YOU TALK ON THE PHONE WITH FAMILY, FRIENDS, OR NEIGHBORS?: ONCE A WEEK
HOW OFTEN DO YOU ATTEND CHURCH OR RELIGIOUS SERVICES?: NEVER
HOW OFTEN DO YOU GET TOGETHER WITH FRIENDS OR RELATIVES?: PATIENT DECLINED
WITHIN THE PAST 12 MONTHS, YOU WORRIED THAT YOUR FOOD WOULD RUN OUT BEFORE YOU GOT THE MONEY TO BUY MORE: NEVER TRUE
DO YOU BELONG TO ANY CLUBS OR ORGANIZATIONS SUCH AS CHURCH GROUPS UNIONS, FRATERNAL OR ATHLETIC GROUPS, OR SCHOOL GROUPS?: NO
ARE YOU MARRIED, WIDOWED, DIVORCED, SEPARATED, NEVER MARRIED, OR LIVING WITH A PARTNER?: LIVING WITH PARTNER

## 2023-09-30 ASSESSMENT — LIFESTYLE VARIABLES
AUDIT-C TOTAL SCORE: 4
HOW OFTEN DO YOU HAVE A DRINK CONTAINING ALCOHOL: 2-3 TIMES A WEEK
HOW MANY STANDARD DRINKS CONTAINING ALCOHOL DO YOU HAVE ON A TYPICAL DAY: 3 OR 4
HOW OFTEN DO YOU HAVE SIX OR MORE DRINKS ON ONE OCCASION: NEVER
SKIP TO QUESTIONS 9-10: 0

## 2023-10-03 ENCOUNTER — OFFICE VISIT (OUTPATIENT)
Dept: MEDICAL GROUP | Facility: CLINIC | Age: 58
End: 2023-10-03
Payer: MEDICAID

## 2023-10-03 VITALS
DIASTOLIC BLOOD PRESSURE: 88 MMHG | SYSTOLIC BLOOD PRESSURE: 142 MMHG | HEART RATE: 92 BPM | OXYGEN SATURATION: 97 % | HEIGHT: 68 IN | BODY MASS INDEX: 30.14 KG/M2 | TEMPERATURE: 97.8 F | WEIGHT: 198.9 LBS

## 2023-10-03 DIAGNOSIS — Z12.12 SCREENING FOR COLORECTAL CANCER: ICD-10-CM

## 2023-10-03 DIAGNOSIS — Z12.11 SCREENING FOR COLORECTAL CANCER: ICD-10-CM

## 2023-10-03 DIAGNOSIS — E66.9 OBESITY (BMI 30-39.9): ICD-10-CM

## 2023-10-03 DIAGNOSIS — F17.200 SMOKING: ICD-10-CM

## 2023-10-03 DIAGNOSIS — I10 ESSENTIAL HYPERTENSION: ICD-10-CM

## 2023-10-03 DIAGNOSIS — Z78.0 POSTMENOPAUSAL: ICD-10-CM

## 2023-10-03 DIAGNOSIS — Z00.121 WELL ADOLESCENT VISIT WITH ABNORMAL FINDINGS: ICD-10-CM

## 2023-10-03 DIAGNOSIS — Z12.31 ENCOUNTER FOR SCREENING MAMMOGRAM FOR BREAST CANCER: ICD-10-CM

## 2023-10-03 PROCEDURE — 3079F DIAST BP 80-89 MM HG: CPT

## 2023-10-03 PROCEDURE — 99386 PREV VISIT NEW AGE 40-64: CPT | Mod: GE

## 2023-10-03 PROCEDURE — 3077F SYST BP >= 140 MM HG: CPT

## 2023-10-03 RX ORDER — GUAIFENESIN 600 MG/1
600 TABLET, EXTENDED RELEASE ORAL EVERY 12 HOURS
Qty: 28 TABLET | Refills: 2 | Status: SHIPPED | OUTPATIENT
Start: 2023-10-03 | End: 2023-12-08

## 2023-10-03 RX ORDER — LOSARTAN POTASSIUM 25 MG/1
25 TABLET ORAL DAILY
Qty: 30 TABLET | Refills: 1 | Status: SHIPPED | OUTPATIENT
Start: 2023-10-03 | End: 2023-11-30

## 2023-10-03 ASSESSMENT — PATIENT HEALTH QUESTIONNAIRE - PHQ9: CLINICAL INTERPRETATION OF PHQ2 SCORE: 0

## 2023-11-20 DIAGNOSIS — Z12.2 SCREENING FOR LUNG CANCER: ICD-10-CM

## 2023-11-20 DIAGNOSIS — F17.200 SMOKING: ICD-10-CM

## 2023-11-30 RX ORDER — LOSARTAN POTASSIUM 25 MG/1
25 TABLET ORAL DAILY
Qty: 30 TABLET | Refills: 0 | Status: SHIPPED | OUTPATIENT
Start: 2023-11-30 | End: 2024-01-04

## 2023-12-08 ENCOUNTER — OFFICE VISIT (OUTPATIENT)
Dept: MEDICAL GROUP | Facility: CLINIC | Age: 58
End: 2023-12-08
Payer: MEDICAID

## 2023-12-08 VITALS
OXYGEN SATURATION: 97 % | SYSTOLIC BLOOD PRESSURE: 130 MMHG | TEMPERATURE: 97.8 F | HEIGHT: 68 IN | WEIGHT: 194.2 LBS | HEART RATE: 96 BPM | BODY MASS INDEX: 29.43 KG/M2 | DIASTOLIC BLOOD PRESSURE: 80 MMHG

## 2023-12-08 DIAGNOSIS — R73.09 ELEVATED GLUCOSE: ICD-10-CM

## 2023-12-08 DIAGNOSIS — I10 ESSENTIAL HYPERTENSION: ICD-10-CM

## 2023-12-08 DIAGNOSIS — E78.5 DYSLIPIDEMIA: ICD-10-CM

## 2023-12-08 LAB
HBA1C MFR BLD: 5.4 % (ref ?–5.8)
POCT INT CON NEG: NEGATIVE
POCT INT CON POS: POSITIVE

## 2023-12-08 PROCEDURE — 83036 HEMOGLOBIN GLYCOSYLATED A1C: CPT

## 2023-12-08 PROCEDURE — 3079F DIAST BP 80-89 MM HG: CPT

## 2023-12-08 PROCEDURE — 99213 OFFICE O/P EST LOW 20 MIN: CPT | Mod: GE

## 2023-12-08 PROCEDURE — 3075F SYST BP GE 130 - 139MM HG: CPT

## 2023-12-08 RX ORDER — HYDROCODONE BITARTRATE AND ACETAMINOPHEN 7.5; 325 MG/1; MG/1
TABLET ORAL
COMMUNITY
Start: 2023-12-07

## 2023-12-08 RX ORDER — AMOXICILLIN 500 MG/1
CAPSULE ORAL
COMMUNITY
Start: 2023-12-07

## 2023-12-08 RX ORDER — ATORVASTATIN CALCIUM 20 MG/1
20 TABLET, FILM COATED ORAL NIGHTLY
Qty: 30 TABLET | Refills: 2 | Status: SHIPPED | OUTPATIENT
Start: 2023-12-08 | End: 2024-03-07

## 2023-12-08 NOTE — PROGRESS NOTES
[facility] MEDICINE     PATIENT ID:  NAME:  Doris Tate  MRN:               [id number]  YOB: 1965    Date: 8:57 AM      Resident: Nicholas Colbert M.D.    CC:  Hypertension      HPI: Doris Tate is a 58 y.o. female who presented for follow-up of hypertension.  Patient states that she was seen in clinic a few months ago when she was trying to have multiple teeth pulled at the oral surgeons office but they would not due to elevation in her blood pressure.  Patient was started on losartan 25 mg which she has been taking regularly.  She states that she was able to have her teeth pulled but continues to have high readings at home.  She states at home her blood pressure cuff reads 150/100.  Patient states she is taking her blood pressure while seated with her arm at heart level after resting for approximately 10 minutes with a cuff that is on her bicep.  She also states that she recently had her blood work completed and has scheduled appointments for other preventative health visits with GI for colonoscopy, mammogram, but has had difficulty getting in with the lung cancer screening group.  Otherwise patient states she has no current concerns and will be following up with her dentist for her recheck after oral surgery.    No problems updated.    REVIEW OF SYSTEMS:   Ten systems reviewed and were negative except as noted in the HPI.                PROBLEM LIST  Patient Active Problem List   Diagnosis    Closed Galeazzi's fracture of left radius        PAST SURGICAL HISTORY:  Past Surgical History:   Procedure Laterality Date    ORIF, WRIST Left 4/20/2017    Procedure: WRIST ORIF;  Surgeon: Cortez Mccall M.D.;  Location: SURGERY [facility];  Service:        FAMILY HISTORY:  No family history on file.    SOCIAL HISTORY:   Social History     Tobacco Use    Smoking status: Every Day    Smokeless tobacco: Former   Substance Use Topics    Alcohol use: Yes       ALLERGIES:  No Known  "Allergies    OUTPATIENT MEDICATIONS:    Current Outpatient Medications:     amoxicillin (AMOXIL) 500 MG Cap, , Disp: , Rfl:     HYDROcodone-acetaminophen (NORCO) 7.5-325 MG tab, , Disp: , Rfl:     atorvastatin (LIPITOR) 20 MG Tab, Take 1 Tablet by mouth every evening for 90 days., Disp: 30 Tablet, Rfl: 2    losartan (COZAAR) 25 MG Tab, Take 1 tablet by mouth once daily, Disp: 30 Tablet, Rfl: 0    guaiFENesin ER (MUCINEX) 600 MG TABLET SR 12 HR, Take 1 Tablet by mouth every 12 hours. (Patient not taking: Reported on 12/8/2023), Disp: 28 Tablet, Rfl: 2    benzonatate (TESSALON) 100 MG Cap, Take 1 Capsule by mouth 3 times a day as needed for Cough. (Patient not taking: Reported on 12/8/2023), Disp: 60 Capsule, Rfl: 0    albuterol 108 (90 Base) MCG/ACT Aero Soln inhalation aerosol, Inhale 2 Puffs every 6 hours as needed for Shortness of Breath. (Patient not taking: Reported on 12/8/2023), Disp: 8.5 g, Rfl: 0    ibuprofen (MOTRIN) 200 MG Tab, Take 400 mg by mouth every 6 hours as needed for Mild Pain. Indications: over the counter (Patient not taking: Reported on 10/3/2023), Disp: , Rfl:     PHYSICAL EXAM:  Vitals:    12/08/23 0824   BP: 130/80   BP Location: Left arm   Patient Position: Sitting   BP Cuff Size: Adult   Pulse: 96   Temp: 36.6 °C (97.8 °F)   TempSrc: Temporal   SpO2: 97%   Weight: 88.1 kg (194 lb 3.2 oz)   Height: 1.727 m (5' 8\")       General: Pt resting in NAD, pleasant and cooperative   Skin:  Pink, warm and dry.  HEENT: NC/AT. EOMI. no dentition, mild swelling to lower gumline without any discharge, erythema or concern for infection.  Lungs:  Symmetrical.  CTAB, good air movement, no adventitious sounds  Cardiovascular:  S1/S2 RRR, no murmurs rubs or gallops  Abdomen:  Abdomen is soft, nontender  Extremities:  Full range of motion.  CNS:  Muscle tone is normal. No gross focal neurologic deficits      ASSESSMENT/PLAN:   58 y.o. female who presents to clinic for follow-up on hypertension.  Patient has " been taking losartan 25 mg daily with improvement in her blood pressure.  She was able to get all of her teeth pulled and blood pressure at her dental office was 130/80 as well as a similar measurement today.  Patient does note that she has had elevated blood pressures at home in the range of 150/100.  Counseled patient that her cath may be incorrect or she may still be having high blood pressure at home.  Will plan to have the patient follow-up with an MA visit to check her blood pressure cuff against our calibrated blood pressure cuff.  Counseled patient that if her cuff is accurate then we will likely increase medication regimen for better hypertensive control.  Patient is amenable to this.  Also on review of lab work patient had an elevated fasting glucose, A1c performed today of 5.4 which is normal.  Patient also had elevated cholesterol on lab work.  ASCVD score of 9.1% currently, counseled patient on heart disease prevention and statin medication.  At this time we will start atorvastatin 20 mg daily with a plan to increase to 40 mg at follow-up in 3 months if no adverse effects.    Problem List Items Addressed This Visit    None  Visit Diagnoses       Dyslipidemia        Relevant Medications    atorvastatin (LIPITOR) 20 MG Tab    Elevated glucose        Relevant Orders    POCT A1C (Completed)    Essential hypertension        Relevant Medications    atorvastatin (LIPITOR) 20 MG Tab            Nicholas Colbert M.D.  PGY-3  [facility]

## 2024-01-04 RX ORDER — LOSARTAN POTASSIUM 25 MG/1
25 TABLET ORAL DAILY
Qty: 30 TABLET | Refills: 0 | Status: SHIPPED | OUTPATIENT
Start: 2024-01-04 | End: 2024-02-17

## 2024-01-18 ENCOUNTER — HOSPITAL ENCOUNTER (OUTPATIENT)
Dept: RADIOLOGY | Facility: MEDICAL CENTER | Age: 59
End: 2024-01-18
Payer: MEDICAID

## 2024-01-18 DIAGNOSIS — Z78.0 POSTMENOPAUSAL: ICD-10-CM

## 2024-01-18 PROCEDURE — 77080 DXA BONE DENSITY AXIAL: CPT

## 2024-02-12 NOTE — TELEPHONE ENCOUNTER
Received request via: Patient    Was the patient seen in the last year in this department? Yes    Does the patient have an active prescription (recently filled or refills available) for medication(s) requested? No    Pharmacy Name: [facility]    Does the patient have half-way Plus and need 100 day supply (blood pressure, diabetes and cholesterol meds only)? Patient does not have SCP

## 2024-02-17 RX ORDER — LOSARTAN POTASSIUM 25 MG/1
25 TABLET ORAL DAILY
Qty: 30 TABLET | Refills: 0 | Status: SHIPPED | OUTPATIENT
Start: 2024-02-17 | End: 2024-03-20

## 2024-03-20 RX ORDER — LOSARTAN POTASSIUM 25 MG/1
25 TABLET ORAL DAILY
Qty: 30 TABLET | Refills: 0 | Status: SHIPPED | OUTPATIENT
Start: 2024-03-20

## 2024-03-29 ENCOUNTER — APPOINTMENT (OUTPATIENT)
Dept: RADIOLOGY | Facility: CLINIC | Age: 59
End: 2024-03-29
Payer: MEDICAID

## 2024-03-29 ENCOUNTER — OFFICE VISIT (OUTPATIENT)
Dept: MEDICAL GROUP | Facility: CLINIC | Age: 59
End: 2024-03-29
Payer: MEDICAID

## 2024-03-29 VITALS
HEIGHT: 68 IN | DIASTOLIC BLOOD PRESSURE: 86 MMHG | BODY MASS INDEX: 27.61 KG/M2 | SYSTOLIC BLOOD PRESSURE: 118 MMHG | TEMPERATURE: 96.6 F | OXYGEN SATURATION: 95 % | WEIGHT: 182.2 LBS | HEART RATE: 101 BPM

## 2024-03-29 DIAGNOSIS — R13.10 DYSPHAGIA, UNSPECIFIED TYPE: ICD-10-CM

## 2024-03-29 DIAGNOSIS — M17.12 PRIMARY OSTEOARTHRITIS OF LEFT KNEE: ICD-10-CM

## 2024-03-29 DIAGNOSIS — M25.562 CHRONIC PAIN OF LEFT KNEE: ICD-10-CM

## 2024-03-29 DIAGNOSIS — G89.29 CHRONIC PAIN OF LEFT KNEE: ICD-10-CM

## 2024-03-29 DIAGNOSIS — R11.0 NAUSEA: ICD-10-CM

## 2024-03-29 DIAGNOSIS — F17.200 SMOKING: ICD-10-CM

## 2024-03-29 DIAGNOSIS — K21.9 GASTROESOPHAGEAL REFLUX DISEASE, UNSPECIFIED WHETHER ESOPHAGITIS PRESENT: ICD-10-CM

## 2024-03-29 DIAGNOSIS — Z72.0 TOBACCO USE: ICD-10-CM

## 2024-03-29 DIAGNOSIS — Z87.828 HX OF TEAR OF ACL (ANTERIOR CRUCIATE LIGAMENT): ICD-10-CM

## 2024-03-29 PROCEDURE — 73562 X-RAY EXAM OF KNEE 3: CPT | Mod: TC,LT | Performed by: RADIOLOGY

## 2024-03-29 RX ORDER — OMEPRAZOLE 40 MG/1
40 CAPSULE, DELAYED RELEASE ORAL DAILY
Qty: 30 CAPSULE | Refills: 1 | Status: SHIPPED | OUTPATIENT
Start: 2024-03-29

## 2024-03-29 ASSESSMENT — PATIENT HEALTH QUESTIONNAIRE - PHQ9: CLINICAL INTERPRETATION OF PHQ2 SCORE: 0

## 2024-03-29 NOTE — PROGRESS NOTES
[facility] MEDICINE     PATIENT ID:  NAME:  Doris Tate  MRN:               [id number]  YOB: 1965    Date: 8:41 AM      Resident: Nicholas Colbert M.D.    CC:  Nausea and vomiting      HPI: Doris Tate is a 59 y.o. female who presented to clinic for evaluation of nausea and difficulty swallowing.  She states that she has a longstanding history of gastric reflux and occasionally uses H2 blockers over-the-counter for symptomatic relief but does not take these regularly.  She states since having oral surgery to have her teeth pulled and get dentures she has noticed some difficulty swallowing that has resulted in a few episodes of vomiting.  She describes emesis as whenever she was eating and seems to happen immediately as she tries to swallow.  She also notes chronic left knee pain since having ACL surgery approximately 7 years ago.  Patient states that her pain seems to be slightly improved with gentle movement such as walking.  She denies any recent injury or trauma to the left knee.  She states that her pain has been slowly progressively worsening since her surgery 7 years ago.  Patient also notes that she was advised that she was declined from a cancer screening program due to insurance.  Otherwise patient states she is at her baseline state of health and has no further concerns.    No problems updated.    REVIEW OF SYSTEMS:   Ten systems reviewed and were negative except as noted in the HPI.                PROBLEM LIST  Patient Active Problem List   Diagnosis    Closed Galeazzi's fracture of left radius        PAST SURGICAL HISTORY:  Past Surgical History:   Procedure Laterality Date    ORIF, WRIST Left 4/20/2017    Procedure: WRIST ORIF;  Surgeon: Cortez Mccall M.D.;  Location: SURGERY [facility];  Service:        FAMILY HISTORY:  No family history on file.    SOCIAL HISTORY:   Social History     Tobacco Use    Smoking status: Every Day    Smokeless tobacco: Former   Substance Use  "Topics    Alcohol use: Yes       ALLERGIES:  No Known Allergies    OUTPATIENT MEDICATIONS:    Current Outpatient Medications:     omeprazole (PRILOSEC) 40 MG delayed-release capsule, Take 1 Capsule by mouth every day., Disp: 30 Capsule, Rfl: 1    losartan (COZAAR) 25 MG Tab, Take 1 tablet by mouth once daily, Disp: 30 Tablet, Rfl: 0    PHYSICAL EXAM:  Vitals:    03/29/24 0821   BP: 118/86   Pulse: (!) 101   Temp: 35.9 °C (96.6 °F)   TempSrc: Temporal   SpO2: 95%   Weight: 82.6 kg (182 lb 3.2 oz)   Height: 1.727 m (5' 8\")       General: Pt resting in NAD, pleasant and cooperative   Skin:  Pink, warm and dry.  HEENT: NC/AT. EOMI.  Lungs:  Symmetrical.  Slightly diminished throughout but CTAB, good air movement, no adventitious sounds  Cardiovascular:  S1/S2 RRR, no murmurs rubs or gallops  Abdomen:  Abdomen is soft, nontender  Extremities:  Full range of motion.  No deformity, no effusion to knees bilaterally, patient notes subjective pain with active range of motion of the left knee  CNS:  Muscle tone is normal. No gross focal neurologic deficits      ASSESSMENT/PLAN:   59 y.o. female who presents to clinic with multiple concerns.    Nausea, vomiting, dysphagia, GERD:  Patient reports longstanding history of GERD and uses H2 blockers intermittently with some relief.  She notes that she has never tried a PPI in the past and is interested in medical management given more episodes of nausea, vomiting with nonbloody nonbilious emesis and difficulty swallowing since having oral surgery and getting dentures.  At this time we will start patient on PPI therapy with omeprazole 40 mg daily.  Counseled patient that we will start this medication for 2 months and then take a drug holiday and see if symptoms resolve or return.  Advised patient if patient symptoms do return that we will pursue further testing with H. pylori testing and after testing is completed restart the PPI.  Will also send patient for barium swallow at this " time given her dysphagia which does not seem to be related to her GERD symptoms every time.    2. Chronic left knee pain:  Patient reports history of left ACL tear approximately 7 years ago.  Upon chart review patient had an MRI at that time which also shows a left MCL tear.  Patient reports no recent injury or trauma but states that she has progressively worsening left knee pain.  She describes the pain as slightly improved with gentle movement such as walking.  At this time we will perform left knee x-ray. Left knee x-ray shows medial osteoarthritis and post surgical changes. Discussed Acetaminophen 3g daily for pain and Ibuprofen for breakthrough pain. PT referral placed as well. Patient is interested in discussing with orthopedic surgeon for possible surgical intervention. Referral placed at this time.     3. Lung cancer screening:  Patient was denied from lung cancer screening program, will order CT chest without per recommendation from lung cancer screening program in the scenarios.  She states she has a history of 1/2 ppy smoking history of 40 years: 20 pack years.    Problem List Items Addressed This Visit    None  Visit Diagnoses       Chronic pain of left knee        Relevant Orders    DX-KNEE 3 VIEWS Atraumatic Pain/Swelling/Deformity    Dysphagia, unspecified type        Relevant Medications    omeprazole (PRILOSEC) 40 MG delayed-release capsule    Other Relevant Orders    DX-ESOPH. FLUORO (BARIUM SWALLOW)    Gastroesophageal reflux disease, unspecified whether esophagitis present        Relevant Medications    omeprazole (PRILOSEC) 40 MG delayed-release capsule    Smoking        Relevant Orders    CT-CHEST (THORAX) W/O    Nausea        Tobacco use                Nicholas Colbert M.D.  PGY-3  [facility]

## 2024-04-16 NOTE — TELEPHONE ENCOUNTER
Received request via: Pharmacy    Was the patient seen in the last year in this department? Yes    Does the patient have an active prescription (recently filled or refills available) for medication(s) requested? No    Pharmacy Name: [facility]    Does the patient have [facility] Plus and need 100 day supply (blood pressure, diabetes and cholesterol meds only)? Patient does not have SCP

## 2024-04-17 RX ORDER — LOSARTAN POTASSIUM 25 MG/1
25 TABLET ORAL DAILY
Qty: 30 TABLET | Refills: 0 | Status: SHIPPED | OUTPATIENT
Start: 2024-04-17

## 2024-05-02 ENCOUNTER — APPOINTMENT (OUTPATIENT)
Dept: RADIOLOGY | Facility: IMAGING CENTER | Age: 59
End: 2024-05-02
Attending: ORTHOPAEDIC SURGERY
Payer: MEDICAID

## 2024-05-02 ENCOUNTER — OFFICE VISIT (OUTPATIENT)
Dept: SURGICAL ONCOLOGY | Facility: MEDICAL CENTER | Age: 59
End: 2024-05-02
Payer: MEDICAID

## 2024-05-02 VITALS
BODY MASS INDEX: 28.34 KG/M2 | HEART RATE: 91 BPM | OXYGEN SATURATION: 99 % | SYSTOLIC BLOOD PRESSURE: 130 MMHG | DIASTOLIC BLOOD PRESSURE: 96 MMHG | TEMPERATURE: 97.3 F | HEIGHT: 68 IN | WEIGHT: 187 LBS

## 2024-05-02 DIAGNOSIS — M17.32 POST-TRAUMATIC OSTEOARTHRITIS OF LEFT KNEE: Primary | ICD-10-CM

## 2024-05-02 DIAGNOSIS — G89.29 CHRONIC PAIN OF LEFT KNEE: ICD-10-CM

## 2024-05-02 DIAGNOSIS — M25.562 CHRONIC PAIN OF LEFT KNEE: ICD-10-CM

## 2024-05-02 PROCEDURE — 99204 OFFICE O/P NEW MOD 45 MIN: CPT | Performed by: ORTHOPAEDIC SURGERY

## 2024-05-02 PROCEDURE — 3080F DIAST BP >= 90 MM HG: CPT | Performed by: ORTHOPAEDIC SURGERY

## 2024-05-02 PROCEDURE — 73560 X-RAY EXAM OF KNEE 1 OR 2: CPT | Mod: TC,LT | Performed by: ORTHOPAEDIC SURGERY

## 2024-05-02 PROCEDURE — 3075F SYST BP GE 130 - 139MM HG: CPT | Performed by: ORTHOPAEDIC SURGERY

## 2024-05-02 RX ORDER — MELOXICAM 15 MG/1
15 TABLET ORAL DAILY
Qty: 30 TABLET | Refills: 2 | Status: SHIPPED | OUTPATIENT
Start: 2024-05-02

## 2024-05-02 ASSESSMENT — ENCOUNTER SYMPTOMS
SENSORY CHANGE: 0
CHILLS: 0
WEAKNESS: 0
MYALGIAS: 0
FEVER: 0

## 2024-05-02 NOTE — PROGRESS NOTES
"  Subjective:   2024  8:14 AM  Primary care physician:Nicholas Colbert M.D.    Chief Complaint: left knee pain    History of presenting illness:  Doris Tate  is a pleasant 59 y.o. female who was referred for evaluation of left knee pain.  She reports history of an ACL tear and reconstruction 7 years ago.  She states since that hernia has never been \"normal.\".  Over the last 6 months or so her symptoms have worsened dramatically though.  She has pain on a daily basis.  Pain is localized mostly on the lateral aspect of the knee.  This is associated with some weakness and feelings of instability.  She denies any numbness or paresthesias.  She has not had any interventions for treatment of her knee symptoms including oral medications, injections, physical therapy or bracing.    History of diabetes: no  History of tobacco use: Actively smoking 1/2 pack per day.   History of renal disease: no  Use of anticoagulants: no  Prior surgeries on this limb/joint: yes - ACL reconstruction 7 years ago    Past Medical History:   Diagnosis Date    Compression fracture of L1 lumbar vertebra (HCC)     Compression fracture of L2 lumbar vertebra (HCC)     Cyst of ovary, left     GERD (gastroesophageal reflux disease)     H/O:      History of appendectomy     Hypertension      Past Surgical History:   Procedure Laterality Date    ORIF, WRIST Left 2017    Procedure: WRIST ORIF;  Surgeon: Cortez Mccall M.D.;  Location: SURGERY [facility];  Service:      No Known Allergies  Outpatient Encounter Medications as of 2024   Medication Sig Dispense Refill    losartan (COZAAR) 25 MG Tab Take 1 tablet by mouth once daily 30 Tablet 0    omeprazole (PRILOSEC) 40 MG delayed-release capsule Take 1 Capsule by mouth every day. 30 Capsule 1     No facility-administered encounter medications on file as of 2024.     Social History     Socioeconomic History    Marital status:      Spouse name: Not on file    Number " of children: Not on file    Years of education: Not on file    Highest education level: 12th grade   Occupational History    Not on file   Tobacco Use    Smoking status: Every Day    Smokeless tobacco: Former   Substance and Sexual Activity    Alcohol use: Yes    Drug use: No    Sexual activity: Not on file   Other Topics Concern    Not on file   Social History Narrative    Not on file     Social Determinants of Health     Financial Resource Strain: Low Risk  (9/30/2023)    Overall Financial Resource Strain (CARDIA)     Difficulty of Paying Living Expenses: Not hard at all   Food Insecurity: No Food Insecurity (9/30/2023)    Hunger Vital Sign     Worried About Running Out of Food in the Last Year: Never true     Ran Out of Food in the Last Year: Never true   Transportation Needs: No Transportation Needs (9/30/2023)    PRAPARE - Transportation     Lack of Transportation (Medical): No     Lack of Transportation (Non-Medical): No   Physical Activity: Inactive (9/30/2023)    Exercise Vital Sign     Days of Exercise per Week: 0 days     Minutes of Exercise per Session: 0 min   Stress: Patient Declined (9/30/2023)    [employer] [place] [employer] - Occupational Stress Questionnaire     Feeling of Stress : Patient declined   Social Connections: Unknown (9/30/2023)    Social Connection and Isolation Panel [NHANES]     Frequency of Communication with Friends and Family: Once a week     Frequency of Social Gatherings with Friends and Family: Patient declined     Attends Restorationism Services: Never     Active Member of Clubs or Organizations: No     Attends Club or Organization Meetings: Never     Marital Status: Living with partner   Intimate Partner Violence: Not on file   Housing Stability: Unknown (9/30/2023)    Housing Stability Vital Sign     Unable to Pay for Housing in the Last Year: Not on file     Number of Places Lived in the Last Year: 1     Unstable Housing in the Last Year: No      Social History      Tobacco Use   Smoking Status Every Day   Smokeless Tobacco Former     Social History     Substance and Sexual Activity   Alcohol Use Yes     Social History     Substance and Sexual Activity   Drug Use No        No family history on file.    Review of Systems   Constitutional:  Negative for chills and fever.   Musculoskeletal:  Positive for joint pain. Negative for myalgias.   Neurological:  Negative for sensory change and weakness.        Objective:   There were no vitals taken for this visit.    Physical Exam  Constitutional:       General: She is not in acute distress.     Appearance: Normal appearance. She is not ill-appearing.   HENT:      Head: Normocephalic and atraumatic.      Right Ear: External ear normal.      Left Ear: External ear normal.      Mouth/Throat:      Mouth: Mucous membranes are moist.   Eyes:      Extraocular Movements: Extraocular movements intact.      Conjunctiva/sclera: Conjunctivae normal.   Cardiovascular:      Rate and Rhythm: Normal rate.      Pulses: Normal pulses.   Pulmonary:      Effort: Pulmonary effort is normal.      Breath sounds: No wheezing.   Abdominal:      General: Abdomen is flat. There is no distension.   Musculoskeletal:      Cervical back: Normal range of motion and neck supple.      Comments: left knee: Overlying skin demonstrates no erythema, ecchymoses or wounds. There are well healed surgical scars. Knee ROM is 0-95.  SILT spn, dpn, plantar and sural nerves. Motor intact to knee extension, ankle dorsiflexion, ankle plantar flexion, and eversion. DP/PT pulse 2+. There is tenderness at the medial and lateral joint line. There is not tenderness elsewhere. positive Lachman. stable varus/valgus stress.       Skin:     General: Skin is warm and dry.      Capillary Refill: Capillary refill takes less than 2 seconds.   Neurological:      Mental Status: She is alert and oriented to person, place, and time.   Psychiatric:         Mood and Affect: Mood normal.          Behavior: Behavior normal.           Imaging:  Per my read, Multiple views of the left knees and single Kelly view of the bilateral knees demonstrates severe degenerative changes including joint space narrowing, subchondral sclerosis, and periarticular osteophytes. There are no acute fractures or destructive osseous lesions. Retained Suture anchor buttons and screw. Joint alignment is valgus.       Diagnosis:     1. Post-traumatic osteoarthritis of left knee                Assessment/Plan:     We discussed conservative and surgical treatment options in detail. Conservative treatment options include weight loss, low impact exercise (pool, bike, etc), oral NSAIDs, corticosteroid injections, bracing, and ablative procedures of the sensory nerves to the joint. Surgical treatment includes total joint arthroplasty. They are not a candidate for partial knee arthroplasty given global involvement of arthritis on radiographs.    She would like to try Meloxicam and I think this is reasonable. Risks and benefits of the medication were discussed. I also recommended she work on smoking cessation as she will need to be tobacco/nicotine free to undergo knee replacement if she chooses to in the future.       Referrals: none  Restrictions: none  New medications/refills: Meloxicam 15 mg qd  Imaging studies: none  Follow-up: 3 months      Agustin Garza DO  Orthopedic Oncology and General Orthopedics

## 2024-05-21 ENCOUNTER — APPOINTMENT (OUTPATIENT)
Dept: RADIOLOGY | Facility: MEDICAL CENTER | Age: 59
End: 2024-05-21
Payer: MEDICAID

## 2024-05-22 DIAGNOSIS — R11.0 NAUSEA: ICD-10-CM

## 2024-05-22 DIAGNOSIS — R13.10 DYSPHAGIA, UNSPECIFIED TYPE: ICD-10-CM

## 2024-05-22 DIAGNOSIS — K21.9 GASTROESOPHAGEAL REFLUX DISEASE, UNSPECIFIED WHETHER ESOPHAGITIS PRESENT: ICD-10-CM

## 2024-05-23 NOTE — TELEPHONE ENCOUNTER
Received request via: Pharmacy    Was the patient seen in the last year in this department? Yes    Does the patient have an active prescription (recently filled or refills available) for medication(s) requested? No    Pharmacy Name: [id number] - [place], [place] - [address]     Does the patient have senior living Plus and need 100 day supply (blood pressure, diabetes and cholesterol meds only)? Patient does not have SCP

## 2024-05-24 RX ORDER — OMEPRAZOLE 40 MG/1
40 CAPSULE, DELAYED RELEASE ORAL DAILY
Qty: 30 CAPSULE | Refills: 0 | Status: SHIPPED | OUTPATIENT
Start: 2024-05-24

## 2024-06-21 ENCOUNTER — APPOINTMENT (OUTPATIENT)
Dept: PHYSICAL THERAPY | Facility: REHABILITATION | Age: 59
End: 2024-06-21
Payer: MEDICAID

## 2024-06-22 DIAGNOSIS — R11.0 NAUSEA: ICD-10-CM

## 2024-06-22 DIAGNOSIS — K21.9 GASTROESOPHAGEAL REFLUX DISEASE, UNSPECIFIED WHETHER ESOPHAGITIS PRESENT: ICD-10-CM

## 2024-06-22 DIAGNOSIS — R13.10 DYSPHAGIA, UNSPECIFIED TYPE: ICD-10-CM

## 2024-06-24 NOTE — TELEPHONE ENCOUNTER
Received request via: Pharmacy    Was the patient seen in the last year in this department? Yes    Does the patient have an active prescription (recently filled or refills available) for medication(s) requested? No    [facility]    Does the patient have [facility] Plus and need 100 day supply (blood pressure, diabetes and cholesterol meds only)? Patient does not have SCP

## 2024-06-27 RX ORDER — OMEPRAZOLE 40 MG/1
40 CAPSULE, DELAYED RELEASE ORAL DAILY
Qty: 30 CAPSULE | Refills: 0 | Status: SHIPPED | OUTPATIENT
Start: 2024-06-27

## 2024-06-28 ENCOUNTER — APPOINTMENT (OUTPATIENT)
Dept: PHYSICAL THERAPY | Facility: REHABILITATION | Age: 59
End: 2024-06-28
Payer: MEDICAID

## 2024-07-05 ENCOUNTER — APPOINTMENT (OUTPATIENT)
Dept: PHYSICAL THERAPY | Facility: REHABILITATION | Age: 59
End: 2024-07-05
Payer: MEDICAID

## 2024-07-12 ENCOUNTER — APPOINTMENT (OUTPATIENT)
Dept: PHYSICAL THERAPY | Facility: REHABILITATION | Age: 59
End: 2024-07-12
Payer: MEDICAID

## 2024-07-19 ENCOUNTER — APPOINTMENT (OUTPATIENT)
Dept: PHYSICAL THERAPY | Facility: REHABILITATION | Age: 59
End: 2024-07-19
Payer: MEDICAID

## 2024-07-26 ENCOUNTER — APPOINTMENT (OUTPATIENT)
Dept: PHYSICAL THERAPY | Facility: REHABILITATION | Age: 59
End: 2024-07-26
Payer: MEDICAID

## 2024-08-01 DIAGNOSIS — M17.32 POST-TRAUMATIC OSTEOARTHRITIS OF LEFT KNEE: ICD-10-CM

## 2024-08-01 RX ORDER — MELOXICAM 15 MG/1
15 TABLET ORAL DAILY
Qty: 30 TABLET | Refills: 0 | Status: SHIPPED | OUTPATIENT
Start: 2024-08-01

## 2024-08-02 ENCOUNTER — APPOINTMENT (OUTPATIENT)
Dept: PHYSICAL THERAPY | Facility: REHABILITATION | Age: 59
End: 2024-08-02
Payer: MEDICAID

## 2024-08-08 ENCOUNTER — OFFICE VISIT (OUTPATIENT)
Dept: SURGICAL ONCOLOGY | Facility: MEDICAL CENTER | Age: 59
End: 2024-08-08
Payer: MEDICAID

## 2024-08-08 VITALS
OXYGEN SATURATION: 97 % | BODY MASS INDEX: 26.83 KG/M2 | HEART RATE: 105 BPM | WEIGHT: 177 LBS | TEMPERATURE: 97.2 F | SYSTOLIC BLOOD PRESSURE: 142 MMHG | DIASTOLIC BLOOD PRESSURE: 82 MMHG | HEIGHT: 68 IN

## 2024-08-08 DIAGNOSIS — M17.32 POST-TRAUMATIC OSTEOARTHRITIS OF LEFT KNEE: ICD-10-CM

## 2024-08-08 PROCEDURE — 3079F DIAST BP 80-89 MM HG: CPT | Performed by: ORTHOPAEDIC SURGERY

## 2024-08-08 PROCEDURE — 3077F SYST BP >= 140 MM HG: CPT | Performed by: ORTHOPAEDIC SURGERY

## 2024-08-08 PROCEDURE — 99213 OFFICE O/P EST LOW 20 MIN: CPT | Mod: 25 | Performed by: ORTHOPAEDIC SURGERY

## 2024-08-08 PROCEDURE — 20610 DRAIN/INJ JOINT/BURSA W/O US: CPT | Mod: LT | Performed by: ORTHOPAEDIC SURGERY

## 2024-08-08 ASSESSMENT — ENCOUNTER SYMPTOMS
SENSORY CHANGE: 0
MYALGIAS: 0
TREMORS: 0
TINGLING: 0
WEAKNESS: 0

## 2024-08-08 NOTE — PROCEDURES
Procedure: Intra-articular corticosteroid injection left knee    Diagnosis: left knee osteoarthritis    Consent: Verbal consent obtained from patient. We discussed risks of infection, blood loss, damage to nerves/vessels in the area, and pain.     Performed by: Agustin Garza DO    Procedure details:  A time out was held to confirm the site and patient. The site was sterilized with chloraprep swabs. Then a mixture of 4 cc of 1% lidocaine and 1 cc of 40 mg/ml of kenalog was injected into the knee through the superolateral portal. A band aid was placed. The patient tolerated the procedure well. They were instructed on signs of infection and to call should any of these symptoms occur.    Agustin Garza DO  Orthopedic Oncology and General Orthopedics   [facility]

## 2024-08-08 NOTE — PROGRESS NOTES
HPI: He returns for follow-up of her left knee arthritis.  She reports her knee pain has been about the same since her last visit.  She has been taking the meloxicam and has noticed little improvement.  She denies any numbness or paresthesias down the leg.  The knee pain continues to be laterally and anteriorly in the knee.  She reports she is still smoking about half pack per day.    Past Medical History:   Past Medical History:   Diagnosis Date    Compression fracture of L1 lumbar vertebra (HCC)     Compression fracture of L2 lumbar vertebra (HCC)     Cyst of ovary, left     GERD (gastroesophageal reflux disease)     H/O:      History of appendectomy     Hypertension        Past Surgical History:  Past Surgical History:   Procedure Laterality Date    ORIF, WRIST Left 2017    Procedure: WRIST ORIF;  Surgeon: Cortez Mccall M.D.;  Location: SURGERY [facility];  Service:        Outpatient Encounter Medications as of 2024   Medication Sig Dispense Refill    meloxicam (MOBIC) 15 MG tablet Take 1 tablet by mouth once daily 30 Tablet 0    omeprazole (PRILOSEC) 40 MG delayed-release capsule Take 1 capsule by mouth once daily 30 Capsule 0    losartan (COZAAR) 25 MG Tab Take 1 tablet by mouth once daily 30 Tablet 0     No facility-administered encounter medications on file as of 2024.        Allergies:   No Known Allergies    Family History:   No family history on file.    Social History:   Social History     Tobacco Use   Smoking Status Every Day   Smokeless Tobacco Former     Social History     Substance and Sexual Activity   Alcohol Use Yes     Social History     Substance and Sexual Activity   Drug Use No     Social History     Socioeconomic History    Marital status:      Spouse name: Not on file    Number of children: Not on file    Years of education: Not on file    Highest education level: 12th grade   Occupational History    Not on file   Tobacco Use    Smoking  status: Every Day    Smokeless tobacco: Former   Substance and Sexual Activity    Alcohol use: Yes    Drug use: No    Sexual activity: Not on file   Other Topics Concern    Not on file   Social History Narrative    Not on file     Social Determinants of Health     Financial Resource Strain: Low Risk  (9/30/2023)    Overall Financial Resource Strain (CARDIA)     Difficulty of Paying Living Expenses: Not hard at all   Food Insecurity: No Food Insecurity (9/30/2023)    Hunger Vital Sign     Worried About Running Out of Food in the Last Year: Never true     Ran Out of Food in the Last Year: Never true   Transportation Needs: No Transportation Needs (9/30/2023)    PRAPARE - Transportation     Lack of Transportation (Medical): No     Lack of Transportation (Non-Medical): No   Physical Activity: Inactive (9/30/2023)    Exercise Vital Sign     Days of Exercise per Week: 0 days     Minutes of Exercise per Session: 0 min   Stress: Patient Declined (9/30/2023)    [place] [place] [employer] - Occupational Stress Questionnaire     Feeling of Stress : Patient declined   Social Connections: Unknown (9/30/2023)    Social Connection and Isolation Panel [NHANES]     Frequency of Communication with Friends and Family: Once a week     Frequency of Social Gatherings with Friends and Family: Patient declined     Attends Scientologist Services: Never     Active Member of Clubs or Organizations: No     Attends Club or Organization Meetings: Never     Marital Status: Living with partner   Intimate Partner Violence: Not on file   Housing Stability: Unknown (9/30/2023)    Housing Stability Vital Sign     Unable to Pay for Housing in the Last Year: Not on file     Number of Places Lived in the Last Year: 1     Unstable Housing in the Last Year: No       Review of Systems:  Review of Systems   Musculoskeletal:  Positive for joint pain. Negative for myalgias.   Neurological:  Negative for tingling, tremors, sensory change and weakness.  "      Physical Exam:  BP (!) 142/82 (BP Location: Left arm, Patient Position: Sitting)   Pulse (!) 105   Temp 36.2 °C (97.2 °F) (Temporal)   Ht 1.727 m (5' 8\")   Wt 80.3 kg (177 lb)   SpO2 97%   BMI 26.91 kg/m²     Physical Exam  Constitutional:       General: She is not in acute distress.     Appearance: Normal appearance. She is not ill-appearing.   HENT:      Head: Normocephalic and atraumatic.   Pulmonary:      Effort: Pulmonary effort is normal. No respiratory distress.      Breath sounds: No stridor. No wheezing.   Abdominal:      General: There is no distension.   Musculoskeletal:      Cervical back: Normal range of motion and neck supple.      Comments: left knee: Overlying skin demonstrates no erythema, ecchymoses or wounds. There are well healed surgical scars. Knee ROM is 0-95.  SILT spn, dpn, plantar and sural nerves. Motor intact to knee extension, ankle dorsiflexion, ankle plantar flexion, and eversion. DP/PT pulse 2+. There is tenderness at the medial and lateral joint line. There is not tenderness elsewhere. positive Lachman. stable varus/valgus stress.   Skin:     General: Skin is warm and dry.      Capillary Refill: Capillary refill takes less than 2 seconds.   Neurological:      General: No focal deficit present.      Mental Status: She is alert and oriented to person, place, and time.   Psychiatric:         Mood and Affect: Mood normal.         Behavior: Behavior normal.           Assessment and Plan:  Left knee posttraumatic osteoarthritis    -I again counseled the patient on the above diagnosis.  We reviewed treatment options in detail once again including conservative and surgical treatments.  She would like to try a corticosteroid injection today.  Please see separate procedure note.  She will follow-up in 3 months to check her progress.  I also counseled her on smoking cessation and she will work on quitting, because we discussed eventually she will likely want a knee replacement.  All " of her questions were answered and she was in agreement with the plan.      Referrals: none  Restrictions: none  New medications/refills: none  Imaging studies: none  Follow-up: 3 months    Agustin Garza DO  Orthopedics and Orthopedic Oncology

## 2024-08-09 ENCOUNTER — APPOINTMENT (OUTPATIENT)
Dept: PHYSICAL THERAPY | Facility: REHABILITATION | Age: 59
End: 2024-08-09
Payer: MEDICAID

## 2024-08-16 ENCOUNTER — APPOINTMENT (OUTPATIENT)
Dept: PHYSICAL THERAPY | Facility: REHABILITATION | Age: 59
End: 2024-08-16
Payer: MEDICAID

## 2024-11-14 ENCOUNTER — OFFICE VISIT (OUTPATIENT)
Dept: SURGICAL ONCOLOGY | Facility: MEDICAL CENTER | Age: 59
End: 2024-11-14
Payer: MEDICAID

## 2024-11-14 VITALS
TEMPERATURE: 98 F | HEIGHT: 68 IN | BODY MASS INDEX: 26.89 KG/M2 | HEART RATE: 88 BPM | SYSTOLIC BLOOD PRESSURE: 150 MMHG | WEIGHT: 177.4 LBS | OXYGEN SATURATION: 97 % | DIASTOLIC BLOOD PRESSURE: 98 MMHG

## 2024-11-14 DIAGNOSIS — M17.32 POST-TRAUMATIC OSTEOARTHRITIS OF LEFT KNEE: ICD-10-CM

## 2024-11-14 PROCEDURE — 20610 DRAIN/INJ JOINT/BURSA W/O US: CPT | Mod: LT | Performed by: ORTHOPAEDIC SURGERY

## 2024-11-14 PROCEDURE — 3077F SYST BP >= 140 MM HG: CPT | Performed by: ORTHOPAEDIC SURGERY

## 2024-11-14 PROCEDURE — 99213 OFFICE O/P EST LOW 20 MIN: CPT | Mod: 25 | Performed by: ORTHOPAEDIC SURGERY

## 2024-11-14 PROCEDURE — 3080F DIAST BP >= 90 MM HG: CPT | Performed by: ORTHOPAEDIC SURGERY

## 2024-11-14 ASSESSMENT — ENCOUNTER SYMPTOMS
WEAKNESS: 0
TINGLING: 0
SENSORY CHANGE: 0
MYALGIAS: 0

## 2024-11-14 NOTE — PROCEDURES
Procedure: Intra-articular corticosteroid injection left knee    Diagnosis: left knee post traumatic osteoarthritis    Consent: Verbal consent obtained from patient. We discussed risks of infection, blood loss, damage to nerves/vessels in the area, and pain.     Performed by: Agustin Garza DO    Procedure details:  A time out was held to confirm the site and patient. The site was sterilized with chloraprep swabs. Then a mixture of 4 cc of 1% lidocaine and 1 cc of 40 mg/ml of kenalog was injected into the knee through the anterolateral portal. A band aid was placed. The patient tolerated the procedure well. They were instructed on signs of infection and to call should any of these symptoms occur.    Agustin Garza DO  Orthopedic Oncology and General Orthopedics   [facility]

## 2024-11-14 NOTE — LETTER
November 14, 2024        Doris Colbert,    I saw Doris today for a follow-up visit.  She has severe posttraumatic osteoarthritis of her left knee and at some point would like to have joint replacement surgery.  Her smoking is a contraindication to proceeding with joint replacement surgery.  She is interested in cessation and would like to discuss different pharmacologic aids to assist in this.  She cannot use nicotine containing products because these also increased risk of wound complications.  I will see her back in 3 months.  Thank you.      Agustin Garza,   Orthopedic Oncology and General Orthopedics   [facility]

## 2024-11-14 NOTE — PROGRESS NOTES
[facility] Orthopedic Surgery        HPI: Doris returns for follow-up of her left knee posttraumatic arthritis.  She reports she got good relief for several months after the last corticosteroid injection in August.  She would like to have another 1 today.  She is interested in total knee arthroplasty.  However, she continues to smoke approximately half a pack cigarettes per day.  We had a long discussion that this is a contraindication to joint replacement surgery because of increased risk of infection.  She understands and would like to discuss cessation aids with her primary care physician.    Past Medical History:   Past Medical History:   Diagnosis Date    Compression fracture of L1 lumbar vertebra (HCC)     Compression fracture of L2 lumbar vertebra (HCC)     Cyst of ovary, left     GERD (gastroesophageal reflux disease)     H/O:      History of appendectomy     Hypertension        Past Surgical History:  Past Surgical History:   Procedure Laterality Date    ORIF, WRIST Left 2017    Procedure: WRIST ORIF;  Surgeon: Cortez Mccall M.D.;  Location: SURGERY [facility];  Service:        Outpatient Encounter Medications as of 2024   Medication Sig Dispense Refill    meloxicam (MOBIC) 15 MG tablet Take 1 tablet by mouth once daily (Patient not taking: Reported on 2024) 30 Tablet 0    omeprazole (PRILOSEC) 40 MG delayed-release capsule Take 1 capsule by mouth once daily (Patient not taking: Reported on 2024) 30 Capsule 0    losartan (COZAAR) 25 MG Tab Take 1 tablet by mouth once daily (Patient not taking: Reported on 2024) 30 Tablet 0     No facility-administered encounter medications on file as of 2024.        Allergies:   No Known Allergies    Family History:   No family history on file.    Social History:   Social History     Tobacco Use   Smoking Status Every Day   Smokeless Tobacco Former     Social History     Substance and Sexual Activity   Alcohol Use Yes      Social History     Substance and Sexual Activity   Drug Use No     Social History     Socioeconomic History    Marital status:      Spouse name: Not on file    Number of children: Not on file    Years of education: Not on file    Highest education level: 12th grade   Occupational History    Not on file   Tobacco Use    Smoking status: Every Day    Smokeless tobacco: Former   Substance and Sexual Activity    Alcohol use: Yes    Drug use: No    Sexual activity: Not on file   Other Topics Concern    Not on file   Social History Narrative    Not on file     Social Drivers of Health     Financial Resource Strain: Low Risk  (9/30/2023)    Overall Financial Resource Strain (CARDIA)     Difficulty of Paying Living Expenses: Not hard at all   Food Insecurity: No Food Insecurity (9/30/2023)    Hunger Vital Sign     Worried About Running Out of Food in the Last Year: Never true     Ran Out of Food in the Last Year: Never true   Transportation Needs: No Transportation Needs (9/30/2023)    PRAPARE - Transportation     Lack of Transportation (Medical): No     Lack of Transportation (Non-Medical): No   Physical Activity: Inactive (9/30/2023)    Exercise Vital Sign     Days of Exercise per Week: 0 days     Minutes of Exercise per Session: 0 min   Stress: Patient Declined (9/30/2023)    [employer] [place] [employer] - Occupational Stress Questionnaire     Feeling of Stress : Patient declined   Social Connections: Unknown (9/30/2023)    Social Connection and Isolation Panel [NHANES]     Frequency of Communication with Friends and Family: Once a week     Frequency of Social Gatherings with Friends and Family: Patient declined     Attends Pentecostal Services: Never     Active Member of Clubs or Organizations: No     Attends Club or Organization Meetings: Never     Marital Status: Living with partner   Intimate Partner Violence: Not on file   Housing Stability: Unknown (9/30/2023)    Housing Stability Vital Sign     " Unable to Pay for Housing in the Last Year: Not on file     Number of Places Lived in the Last Year: 1     Unstable Housing in the Last Year: No       Review of Systems:  Review of Systems   Musculoskeletal:  Positive for joint pain. Negative for myalgias.   Neurological:  Negative for tingling, sensory change and weakness.       Physical Exam:  BP (!) 150/98 (BP Location: Left arm, Patient Position: Sitting, BP Cuff Size: Adult)   Pulse 88   Temp 36.7 °C (98 °F) (Temporal)   Ht 1.727 m (5' 8\")   Wt 80.5 kg (177 lb 6.4 oz)   SpO2 97%   BMI 26.97 kg/m²     Physical Exam  Constitutional:       General: She is not in acute distress.     Appearance: Normal appearance. She is not ill-appearing.   HENT:      Head: Normocephalic and atraumatic.   Pulmonary:      Effort: Pulmonary effort is normal. No respiratory distress.      Breath sounds: No stridor. No wheezing.   Abdominal:      General: There is no distension.   Musculoskeletal:      Cervical back: Normal range of motion and neck supple.      Comments: left knee: Overlying skin demonstrates no erythema, ecchymoses or wounds.  There is a moderate effusion.  There are well healed surgical scars. Knee ROM is 0-105.  SILT spn, dpn, plantar and sural nerves. Motor intact to knee extension, ankle dorsiflexion, ankle plantar flexion, and eversion. DP/PT pulse 2+. There is tenderness at the medial and lateral joint line. There is not tenderness elsewhere. positive Lachman. stable varus/valgus stress.   Skin:     General: Skin is warm and dry.      Capillary Refill: Capillary refill takes less than 2 seconds.   Neurological:      General: No focal deficit present.      Mental Status: She is alert and oriented to person, place, and time.   Psychiatric:         Mood and Affect: Mood normal.         Behavior: Behavior normal.           Assessment and Plan:  Left knee posttraumatic osteoarthritis    -I again counseled the patient on the above diagnosis.  We reviewed " treatment options in detail once again including conservative and surgical treatments.  She requested another corticosteroid injection today.  Please see separate procedure note.  We also discussed smoking cessation at length.  We discussed the nicotine is the biggest problem as it constricts blood flow and can inhibit wound healing.  I recommended smoking cessation with nonnicotine containing medications if needed.  I will send a letter to her primary care physician letting them know if she is interested in quitting.  She will follow-up in 3 months.  All of her questions were answered and she was in agreement with the plan.      Referrals: none  Restrictions: none  New medications/refills: none  Imaging studies: none  Follow-up: 3 months    Agustin Garza,   Orthopedics and Orthopedic Oncology

## 2024-11-14 NOTE — Clinical Note
November 14, 2024    To Whom It May Concern:         This is confirmation that Doris Tate attended her scheduled appointment with Agustin Garza D.O. on 11/14/24.         If you have any questions please do not hesitate to call me at the phone number listed below.    Sincerely,          Agustin Garza D.O.  [phone]

## 2025-02-13 ENCOUNTER — OFFICE VISIT (OUTPATIENT)
Dept: SURGICAL ONCOLOGY | Facility: MEDICAL CENTER | Age: 60
End: 2025-02-13
Payer: MEDICAID

## 2025-02-13 VITALS
TEMPERATURE: 97.8 F | SYSTOLIC BLOOD PRESSURE: 148 MMHG | HEART RATE: 91 BPM | HEIGHT: 68 IN | DIASTOLIC BLOOD PRESSURE: 96 MMHG | WEIGHT: 175 LBS | BODY MASS INDEX: 26.52 KG/M2 | OXYGEN SATURATION: 97 %

## 2025-02-13 DIAGNOSIS — Z96.652 S/P TKR (TOTAL KNEE REPLACEMENT), LEFT: ICD-10-CM

## 2025-02-13 DIAGNOSIS — M17.32 POST-TRAUMATIC OSTEOARTHRITIS OF LEFT KNEE: ICD-10-CM

## 2025-02-13 PROCEDURE — 3077F SYST BP >= 140 MM HG: CPT | Performed by: ORTHOPAEDIC SURGERY

## 2025-02-13 PROCEDURE — 3080F DIAST BP >= 90 MM HG: CPT | Performed by: ORTHOPAEDIC SURGERY

## 2025-02-13 PROCEDURE — 99214 OFFICE O/P EST MOD 30 MIN: CPT | Performed by: ORTHOPAEDIC SURGERY

## 2025-02-13 ASSESSMENT — ENCOUNTER SYMPTOMS
FEVER: 0
SENSORY CHANGE: 0
CHILLS: 0
MYALGIAS: 0
SHORTNESS OF BREATH: 0
WEAKNESS: 0
TINGLING: 0

## 2025-02-13 NOTE — PROGRESS NOTES
[facility] Orthopedic Surgery        HPI: Doris returns for follow-up of her left knee posttraumatic arthritis.  She reports she only got short-term relief after the last injection.  Her knee pain has returned and is quite bothersome.  It is affecting her life on a daily basis.  She also states that she has quit smoking since I last saw her and has been nicotine free since 2024.  She is interested in proceeding with left total knee arthroplasty.  She denies any new medical problems, medications or allergies since I previously saw her.    Past Medical History:   Past Medical History:   Diagnosis Date    Compression fracture of L1 lumbar vertebra (HCC)     Compression fracture of L2 lumbar vertebra (HCC)     Cyst of ovary, left     GERD (gastroesophageal reflux disease)     H/O:      History of appendectomy     Hypertension        Past Surgical History:  Past Surgical History:   Procedure Laterality Date    ORIF, WRIST Left 2017    Procedure: WRIST ORIF;  Surgeon: Cortez Mccall M.D.;  Location: SURGERY [facility];  Service:        Outpatient Encounter Medications as of 2025   Medication Sig Dispense Refill    meloxicam (MOBIC) 15 MG tablet Take 1 tablet by mouth once daily (Patient not taking: Reported on 2025) 30 Tablet 0    omeprazole (PRILOSEC) 40 MG delayed-release capsule Take 1 capsule by mouth once daily (Patient not taking: Reported on 2025) 30 Capsule 0    losartan (COZAAR) 25 MG Tab Take 1 tablet by mouth once daily (Patient not taking: Reported on 2025) 30 Tablet 0     No facility-administered encounter medications on file as of 2025.        Allergies:   No Known Allergies    Family History:   No family history on file.    Social History:   Social History     Tobacco Use   Smoking Status Every Day   Smokeless Tobacco Former     Social History     Substance and Sexual Activity   Alcohol Use Yes     Social History     Substance and Sexual Activity   Drug  Use No     Social History     Socioeconomic History    Marital status:      Spouse name: Not on file    Number of children: Not on file    Years of education: Not on file    Highest education level: 12th grade   Occupational History    Not on file   Tobacco Use    Smoking status: Every Day    Smokeless tobacco: Former   Substance and Sexual Activity    Alcohol use: Yes    Drug use: No    Sexual activity: Not on file   Other Topics Concern    Not on file   Social History Narrative    Not on file     Social Drivers of Health     Financial Resource Strain: Low Risk  (9/30/2023)    Overall Financial Resource Strain (CARDIA)     Difficulty of Paying Living Expenses: Not hard at all   Food Insecurity: No Food Insecurity (9/30/2023)    Hunger Vital Sign     Worried About Running Out of Food in the Last Year: Never true     Ran Out of Food in the Last Year: Never true   Transportation Needs: No Transportation Needs (9/30/2023)    PRAPARE - Transportation     Lack of Transportation (Medical): No     Lack of Transportation (Non-Medical): No   Physical Activity: Inactive (9/30/2023)    Exercise Vital Sign     Days of Exercise per Week: 0 days     Minutes of Exercise per Session: 0 min   Stress: Patient Declined (9/30/2023)    [employer] [place] [employer] - Occupational Stress Questionnaire     Feeling of Stress : Patient declined   Social Connections: Unknown (9/30/2023)    Social Connection and Isolation Panel [NHANES]     Frequency of Communication with Friends and Family: Once a week     Frequency of Social Gatherings with Friends and Family: Patient declined     Attends Catholic Services: Never     Active Member of Clubs or Organizations: No     Attends Club or Organization Meetings: Never     Marital Status: Living with partner   Intimate Partner Violence: Not on file   Housing Stability: Unknown (9/30/2023)    Housing Stability Vital Sign     Unable to Pay for Housing in the Last Year: Not on file     " Number of Places Lived in the Last Year: 1     Unstable Housing in the Last Year: No       Review of Systems:  Review of Systems   Constitutional:  Negative for chills and fever.   Respiratory:  Negative for shortness of breath.    Cardiovascular:  Negative for chest pain.   Musculoskeletal:  Positive for joint pain. Negative for myalgias.   Neurological:  Negative for tingling, sensory change and weakness.       Physical Exam:  BP (!) 148/96 (BP Location: Right arm, Patient Position: Sitting, BP Cuff Size: Adult)   Pulse 91   Temp 36.6 °C (97.8 °F) (Temporal)   Ht 1.727 m (5' 8\")   Wt 79.4 kg (175 lb)   SpO2 97%   BMI 26.61 kg/m²     Physical Exam  Constitutional:       General: She is not in acute distress.     Appearance: Normal appearance. She is not ill-appearing.   HENT:      Head: Normocephalic and atraumatic.   Pulmonary:      Effort: Pulmonary effort is normal. No respiratory distress.   Musculoskeletal:      Cervical back: Normal range of motion and neck supple.      Comments: left knee: Overlying skin demonstrates no erythema, ecchymoses or wounds.  There is a moderate effusion.  There are well healed surgical scars. Knee ROM is 0-105.  SILT spn, dpn, plantar and sural nerves. Motor intact to knee extension, ankle dorsiflexion, ankle plantar flexion, and eversion. DP/PT pulse 2+. There is tenderness at the medial and lateral joint line. There is not tenderness elsewhere. positive Lachman. stable varus/valgus stress.   Skin:     General: Skin is warm and dry.      Capillary Refill: Capillary refill takes less than 2 seconds.   Neurological:      General: No focal deficit present.      Mental Status: She is alert and oriented to person, place, and time.   Psychiatric:         Mood and Affect: Mood normal.         Behavior: Behavior normal.           Assessment and Plan:  Left knee posttraumatic osteoarthritis    -I again counseled the patient on the above diagnosis and treatment options.  She would " like to proceed with left total knee arthroplasty at this time which I think is reasonable as she has tried conservative therapy without satisfactory relief.  I gave her the link for the online arthroplasty class to learn more about total joint arthroplasty in the recovery process.  We discussed that I recommend doing a robotic assisted right total knee arthroplasty given her deformity and previous hardware.  This would require a preoperative CT scan.  This aids in more accurate bone cuts and implant position.  We also discussed that she will need a cognitive test preoperatively given her prior history of tobacco use and recent cessation.  We will plan to get this at her normal preoperative lab visit.  We discussed the need for physical therapy postoperatively and this should be started within 1 week after knee arthroplasty.  I will place that referral today.    A full PARQ was held with the patient. Both surgical and non-operative options were discussed. They decided on surgery through shared decision making. We discussed risks include infection, blood loss, damage to neurovascular structures, DVT/PE, chronic pain, instability, fracture, loosening, need for further surgery and unforeseeable events inherent to medical care. They also understand anesthesia will do a separate consent discussing risks inherent to anesthesia. We also discussed life long dental prophylaxis after surgery. Anytime a dental procedure is planned I should be informed to prescribe oral antibiotics prior to the procedure. They verbalized understanding and were in agreement to proceed with robotic left total knee arthroplasty.       Referrals: physical therapy to start post op  Restrictions: none  New medications/refills: none  Imaging studies: ct left knee zachariah Douglas protocol  Follow-up: pre op    Agustin Garza DO  Orthopedics and Orthopedic Oncology

## 2025-02-14 NOTE — Clinical Note
REFERRAL APPROVAL NOTICE         Sent on February 14, 2025                   Doris Tate  [address]  [place] [address]                   Dear Ms. Tate,    After a careful review of the medical information and benefit coverage, [facility] has processed your referral. See below for additional details.    If applicable, you must be actively enrolled with your insurance for coverage of the authorized service. If you have any questions regarding your coverage, please contact your insurance directly.    REFERRAL INFORMATION   Referral #:  [id number]  Referred-To Department    Referred-By Provider:  Physical Therapy    Agustin Garza D.O.   Phys Therapy Op [facility]      [address]  [address]  [place] [address]  [phone] [address]  [place] [address] [phone]    Referral Start Date:  02/13/2025  Referral End Date:   02/13/2026             SCHEDULING  If you do not already have an appointment, please call [phone] to make an appointment.     MORE INFORMATION  If you do not already have a [facility] account, sign up at: [email or web address]  You can access your medical information, make appointments, see lab results, billing information, and more.  If you have questions regarding this referral, please contact  the [facility] Referrals department at:             [phone]. Monday - Friday 8:00AM - 5:00PM.     Sincerely,    [facility]

## 2025-02-20 ENCOUNTER — TELEPHONE (OUTPATIENT)
Dept: SURGICAL ONCOLOGY | Facility: MEDICAL CENTER | Age: 60
End: 2025-02-20
Payer: MEDICAID

## 2025-02-20 ENCOUNTER — HOSPITAL ENCOUNTER (OUTPATIENT)
Dept: RADIOLOGY | Facility: MEDICAL CENTER | Age: 60
End: 2025-02-20
Attending: ORTHOPAEDIC SURGERY
Payer: MEDICAID

## 2025-02-20 DIAGNOSIS — Z12.2 SCREENING FOR LUNG CANCER: ICD-10-CM

## 2025-02-20 DIAGNOSIS — Z72.0 TOBACCO USE: ICD-10-CM

## 2025-02-20 PROCEDURE — 73700 CT LOWER EXTREMITY W/O DYE: CPT | Mod: LT

## 2025-02-20 NOTE — TELEPHONE ENCOUNTER
I left a message to schedule surgery with Dr. Garza. I left my direct number [phone].    Thank you,  Tania Surgery Scheduler

## 2025-02-26 ENCOUNTER — APPOINTMENT (OUTPATIENT)
Dept: ADMISSIONS | Facility: MEDICAL CENTER | Age: 60
End: 2025-02-26
Attending: ORTHOPAEDIC SURGERY
Payer: MEDICAID

## 2025-02-26 ENCOUNTER — TELEPHONE (OUTPATIENT)
Dept: HEALTH INFORMATION MANAGEMENT | Facility: OTHER | Age: 60
End: 2025-02-26

## 2025-02-27 ENCOUNTER — PRE-ADMISSION TESTING (OUTPATIENT)
Dept: ADMISSIONS | Facility: MEDICAL CENTER | Age: 60
End: 2025-02-27
Attending: ORTHOPAEDIC SURGERY
Payer: MEDICAID

## 2025-02-27 DIAGNOSIS — Z01.812 PRE-OPERATIVE LABORATORY EXAMINATION: ICD-10-CM

## 2025-02-27 DIAGNOSIS — Z01.810 PRE-OPERATIVE CARDIOVASCULAR EXAMINATION: ICD-10-CM

## 2025-02-27 RX ORDER — OMEPRAZOLE 20 MG/1
20 CAPSULE, DELAYED RELEASE ORAL PRN
COMMUNITY

## 2025-02-27 NOTE — PREADMIT AVS NOTE
Current Medications   Medication Instructions    omeprazole (PRILOSEC) 20 MG delayed-release capsule As needed medication, may take if needed, including morning of procedure     losartan (COZAAR) 25 MG Tab Stop 24 hours before surgery

## 2025-02-27 NOTE — PREPROCEDURE INSTRUCTIONS
PreAdmit Telephone Appointment: Reviewed the Preparing for your procedure handout with patient over the phone. Patient instructed per pharmacy guidelines regarding taking, holding or contacting provider for instructions on regularly prescribed medications before surgery. Instructed to take the following medications the day of surgery with a sip of water per pharmacy medication guidelines: Omeprazole if needed  Pt reports she has not been taking her  Losartan because she thinks this pill is too large to swallow.  Educated patient on importance of controlled blood pressure. Advised pt to contact her PCP MD if unable to tolerate her medication and to not stop medications unless first discussed with her PCP.  Pt reports she will take her BP medication and will get out her BP monitor and check blood pressures  Pt denies issues with anesthesia      Confirmed with patient where to check in morning of surgery. Handouts/Brochure/Video emailed to patient.

## 2025-03-07 ENCOUNTER — PRE-ADMISSION TESTING (OUTPATIENT)
Dept: ADMISSIONS | Facility: MEDICAL CENTER | Age: 60
End: 2025-03-07
Attending: ORTHOPAEDIC SURGERY
Payer: MEDICAID

## 2025-03-07 VITALS — HEIGHT: 68 IN | BODY MASS INDEX: 25.46 KG/M2 | WEIGHT: 168 LBS

## 2025-03-07 DIAGNOSIS — Z01.810 PRE-OPERATIVE CARDIOVASCULAR EXAMINATION: ICD-10-CM

## 2025-03-07 DIAGNOSIS — Z01.812 PRE-OPERATIVE LABORATORY EXAMINATION: ICD-10-CM

## 2025-03-07 LAB
ANION GAP SERPL CALC-SCNC: 15 MMOL/L (ref 7–16)
BUN SERPL-MCNC: 7 MG/DL (ref 8–22)
CALCIUM SERPL-MCNC: 9.5 MG/DL (ref 8.4–10.2)
CHLORIDE SERPL-SCNC: 96 MMOL/L (ref 96–112)
CO2 SERPL-SCNC: 20 MMOL/L (ref 20–33)
CREAT SERPL-MCNC: 0.92 MG/DL (ref 0.5–1.4)
EKG IMPRESSION: NORMAL
ERYTHROCYTE [DISTWIDTH] IN BLOOD BY AUTOMATED COUNT: 42.5 FL (ref 35.9–50)
GFR SERPLBLD CREATININE-BSD FMLA CKD-EPI: 70 ML/MIN/1.73 M 2
GLUCOSE SERPL-MCNC: 92 MG/DL (ref 65–99)
HCT VFR BLD AUTO: 51.7 % (ref 37–47)
HGB BLD-MCNC: 17.3 G/DL (ref 12–16)
MCH RBC QN AUTO: 30 PG (ref 27–33)
MCHC RBC AUTO-ENTMCNC: 33.5 G/DL (ref 32.2–35.5)
MCV RBC AUTO: 89.6 FL (ref 81.4–97.8)
PLATELET # BLD AUTO: 280 K/UL (ref 164–446)
PMV BLD AUTO: 11.4 FL (ref 9–12.9)
POTASSIUM SERPL-SCNC: 3.5 MMOL/L (ref 3.6–5.5)
RBC # BLD AUTO: 5.77 M/UL (ref 4.2–5.4)
SCCMEC + MECA PNL NOSE NAA+PROBE: NEGATIVE
SCCMEC + MECA PNL NOSE NAA+PROBE: POSITIVE
SODIUM SERPL-SCNC: 131 MMOL/L (ref 135–145)
WBC # BLD AUTO: 7.3 K/UL (ref 4.8–10.8)

## 2025-03-07 PROCEDURE — 80048 BASIC METABOLIC PNL TOTAL CA: CPT

## 2025-03-07 PROCEDURE — 93005 ELECTROCARDIOGRAM TRACING: CPT | Mod: TC

## 2025-03-07 PROCEDURE — G0480 DRUG TEST DEF 1-7 CLASSES: HCPCS

## 2025-03-07 PROCEDURE — 87640 STAPH A DNA AMP PROBE: CPT | Mod: XU

## 2025-03-07 PROCEDURE — 87641 MR-STAPH DNA AMP PROBE: CPT

## 2025-03-07 PROCEDURE — 93010 ELECTROCARDIOGRAM REPORT: CPT | Performed by: INTERNAL MEDICINE

## 2025-03-07 PROCEDURE — 85027 COMPLETE CBC AUTOMATED: CPT

## 2025-03-07 PROCEDURE — 36415 COLL VENOUS BLD VENIPUNCTURE: CPT

## 2025-03-07 NOTE — DISCHARGE PLANNING
DISCHARGE PLANNING NOTE - TOTAL JOINT    Procedure: ROBOTIC LEFT TOTAL KNEE ARTHROPLASTY  Procedure Date: 3/21/25  Insurance: Payor: [facility] MEDICAID / Plan: [place] MEDICAID / Product Type: *No Product type* /    Equipment currently available at home?  crutches, four-wheel walker, and Hand held shower head  Steps into the home? 4  Steps within the home? 0  Toilet height? Standard  Type of shower? walk-in shower  Home Oxygen? No  Portable tank?    Oxygen Provider:  Planning same day discharge: Yes    Is Outpatient Physical Therapy set up after surgery? Yes  Did you take the Total Joint Class and where or did you receive an Educational booklet? Yes, received NAON book.  Who will be your transportation home on day of discharge? Mauri- S/O    Have you made arrangements to have someone stay with you at home for the first 3 days following discharge, and if so, whom? Mauri- S/O    Have you notified your surgeon that you do not have transportation or someone to help you after discharge? N/A    Are you planning on going to a transitional care facility, for example a skilled nursing facility, post operatively for rehab, and if so, have you contacted your insurance plan to see if they cover this? No      Met with the pt. Pt given a copy of Home Safety Checklist, Equipment Resource Guide, CHG scrub kit and instructions. Expected process in Recovery Room and dc criteria discussed with pt. All questions answered and verbalizes understanding of all instructions. No dc needs identified at this time. Anticipate dc to home without barriers. MRSA swab obtained.

## 2025-03-12 NOTE — TELEPHONE ENCOUNTER
Received request via: Pharmacy    Was the patient seen in the last year in this department? Yes    Does the patient have an active prescription (recently filled or refills available) for medication(s) requested? No    Pharmacy Name: [facility]     Does the patient have MCC Plus and need 100-day supply? (This applies to ALL medications) Patient does not have SCP

## 2025-03-15 LAB
ANABASINE UR-MCNC: NORMAL NG/ML
COTININE UR-MCNC: 890 NG/ML
NICOTINE UR-MCNC: 1648 NG/ML
TRANS-3-OH-COTININE UR-MCNC: >2000 NG/ML

## 2025-03-16 RX ORDER — LOSARTAN POTASSIUM 25 MG/1
25 TABLET ORAL DAILY
Qty: 30 TABLET | Refills: 0 | Status: SHIPPED | OUTPATIENT
Start: 2025-03-16

## 2025-03-17 DIAGNOSIS — M17.32 POST-TRAUMATIC OSTEOARTHRITIS OF LEFT KNEE: ICD-10-CM

## 2025-03-17 NOTE — PROGRESS NOTES
I called Doris to inform her that her cotinine test was positive indicating she is still smoking or using nicotine products. I counseled her that her surgery will need to be canceled and rescheduled once she has stopped. She reports she has quit and is not using any nicotine. I will order another cotinine screen for 1 month and if negative we can reschedule surgery. She verbalized understanding.     Agustin Garza, DO  Orthopedic Oncology and General Orthopedics   [facility]

## 2025-03-27 ENCOUNTER — APPOINTMENT (OUTPATIENT)
Dept: PHYSICAL THERAPY | Facility: MEDICAL CENTER | Age: 60
End: 2025-03-27
Attending: ORTHOPAEDIC SURGERY
Payer: MEDICAID

## 2025-04-03 ENCOUNTER — APPOINTMENT (OUTPATIENT)
Dept: PHYSICAL THERAPY | Facility: MEDICAL CENTER | Age: 60
End: 2025-04-03
Attending: ORTHOPAEDIC SURGERY
Payer: MEDICAID

## 2025-04-10 NOTE — TELEPHONE ENCOUNTER
Received request via: Pharmacy    Was the patient seen in the last year in this department? Yes    Does the patient have an active prescription (recently filled or refills available) for medication(s) requested? No    Pharmacy Name: [id number] - [place], [place] - [address]     Does the patient have retirement Plus and need 100-day supply? (This applies to ALL medications) Patient does not have SCP

## 2025-04-15 ENCOUNTER — HOSPITAL ENCOUNTER (OUTPATIENT)
Facility: MEDICAL CENTER | Age: 60
End: 2025-04-15
Attending: ORTHOPAEDIC SURGERY
Payer: MEDICAID

## 2025-04-15 DIAGNOSIS — M17.32 POST-TRAUMATIC OSTEOARTHRITIS OF LEFT KNEE: ICD-10-CM

## 2025-04-15 PROCEDURE — 36415 COLL VENOUS BLD VENIPUNCTURE: CPT

## 2025-04-15 PROCEDURE — 80307 DRUG TEST PRSMV CHEM ANLYZR: CPT

## 2025-04-20 LAB — TEST NAME 95000: NORMAL

## 2025-04-29 NOTE — TELEPHONE ENCOUNTER
Outcome: Left Message    Lung Cancer Screening eligibility Q & A. Left VM, please transfer to Medical Group Outbound [phone].    Attempt # 2

## 2025-05-05 ENCOUNTER — TELEPHONE (OUTPATIENT)
Facility: MEDICAL CENTER | Age: 60
End: 2025-05-05
Payer: MEDICAID

## 2025-05-05 NOTE — TELEPHONE ENCOUNTER
I called patient and left a message to schedule surgery with Dr. Garza on 6/11/25. I left my direct number [phone].    Thank you,  Tania Surgery Scheduler

## 2025-05-07 RX ORDER — LOSARTAN POTASSIUM 25 MG/1
25 TABLET ORAL DAILY
Qty: 30 TABLET | Refills: 0 | Status: SHIPPED | OUTPATIENT
Start: 2025-05-07

## 2025-05-20 NOTE — TELEPHONE ENCOUNTER
Outcome: Left Message     Lung Cancer Screening eligibility Q & A. Left VM, please transfer to Medical Group Outbound [phone].     Attempt # 3

## 2025-05-22 ENCOUNTER — APPOINTMENT (OUTPATIENT)
Dept: PHYSICAL THERAPY | Facility: MEDICAL CENTER | Age: 60
End: 2025-05-22
Attending: ORTHOPAEDIC SURGERY
Payer: MEDICAID

## 2025-06-02 RX ORDER — LOSARTAN POTASSIUM 25 MG/1
25 TABLET ORAL DAILY
Qty: 30 TABLET | Refills: 0 | Status: SHIPPED | OUTPATIENT
Start: 2025-06-02

## 2025-06-02 NOTE — TELEPHONE ENCOUNTER
Received request via: Patient    Was the patient seen in the last year in this department? No    Does the patient have an active prescription (recently filled or refills available) for medication(s) requested? No    [address] [[id number]] [facility] [address] [place] [address], [place] - [address] [facility] [address] [[id number]]     Does the patient have USP Plus and need 100-day supply? (This applies to ALL medications) Patient does not have SCP

## 2025-06-05 ENCOUNTER — TELEPHONE (OUTPATIENT)
Facility: MEDICAL CENTER | Age: 60
End: 2025-06-05

## 2025-06-27 NOTE — TELEPHONE ENCOUNTER
Received request via: Pharmacy    Was the patient seen in the last year in this department? No    Does the patient have an active prescription (recently filled or refills available) for medication(s) requested? No    Pharmacy Name: [id number] - [place], [place] - [address]     Does the patient have residential Plus and need 100-day supply? (This applies to ALL medications) Patient does not have SCP

## 2025-07-01 ENCOUNTER — APPOINTMENT (OUTPATIENT)
Dept: ADMISSIONS | Facility: MEDICAL CENTER | Age: 60
End: 2025-07-01
Attending: ORTHOPAEDIC SURGERY
Payer: MEDICAID

## 2025-07-03 ENCOUNTER — PRE-ADMISSION TESTING (OUTPATIENT)
Dept: ADMISSIONS | Facility: MEDICAL CENTER | Age: 60
End: 2025-07-03
Attending: ORTHOPAEDIC SURGERY
Payer: MEDICAID

## 2025-07-03 VITALS — HEIGHT: 68 IN | BODY MASS INDEX: 25.54 KG/M2

## 2025-07-03 DIAGNOSIS — Z01.812 PRE-OPERATIVE LABORATORY EXAMINATION: Primary | ICD-10-CM

## 2025-07-03 NOTE — PREADMIT AVS NOTE
Current Medications   Medication Instructions    losartan (COZAAR) 25 MG Tab Stop 24 hours before surgery    omeprazole (PRILOSEC) 20 MG delayed-release capsule Continue taking medication as prescribed, including morning of procedure

## 2025-07-08 ENCOUNTER — TELEPHONE (OUTPATIENT)
Facility: MEDICAL CENTER | Age: 60
End: 2025-07-08

## 2025-07-08 ENCOUNTER — TELEPHONE (OUTPATIENT)
Dept: SURGERY | Facility: MEDICAL CENTER | Age: 60
End: 2025-07-08

## 2025-07-08 NOTE — TELEPHONE ENCOUNTER
I called patient to schedule a pre op appointment with Dr. Garza before surgery on 7/16/25. I also wanted to give her pre registrations number as she missed her appointment today 7/08/25. I left my direct number [phone].    Thank you,  Tania Surgery Scheduler

## 2025-07-09 RX ORDER — LOSARTAN POTASSIUM 25 MG/1
25 TABLET ORAL DAILY
Qty: 30 TABLET | Refills: 0 | Status: SHIPPED | OUTPATIENT
Start: 2025-07-09

## 2025-07-10 ENCOUNTER — PRE-ADMISSION TESTING (OUTPATIENT)
Dept: ADMISSIONS | Facility: MEDICAL CENTER | Age: 60
End: 2025-07-10
Attending: ORTHOPAEDIC SURGERY
Payer: MEDICAID

## 2025-07-10 VITALS — HEIGHT: 68 IN | BODY MASS INDEX: 25.46 KG/M2 | WEIGHT: 168 LBS

## 2025-07-10 DIAGNOSIS — Z01.812 PRE-OPERATIVE LABORATORY EXAMINATION: ICD-10-CM

## 2025-07-10 LAB
ANION GAP SERPL CALC-SCNC: 16 MMOL/L (ref 7–16)
BUN SERPL-MCNC: 8 MG/DL (ref 8–22)
CALCIUM SERPL-MCNC: 9.5 MG/DL (ref 8.5–10.5)
CHLORIDE SERPL-SCNC: 97 MMOL/L (ref 96–112)
CO2 SERPL-SCNC: 19 MMOL/L (ref 20–33)
CREAT SERPL-MCNC: 1.02 MG/DL (ref 0.5–1.4)
ERYTHROCYTE [DISTWIDTH] IN BLOOD BY AUTOMATED COUNT: 44.7 FL (ref 35.9–50)
GFR SERPLBLD CREATININE-BSD FMLA CKD-EPI: 62 ML/MIN/1.73 M 2
GLUCOSE SERPL-MCNC: 78 MG/DL (ref 65–99)
HCT VFR BLD AUTO: 51.2 % (ref 37–47)
HGB BLD-MCNC: 17.3 G/DL (ref 12–16)
MCH RBC QN AUTO: 29.9 PG (ref 27–33)
MCHC RBC AUTO-ENTMCNC: 33.8 G/DL (ref 32.2–35.5)
MCV RBC AUTO: 88.4 FL (ref 81.4–97.8)
PLATELET # BLD AUTO: 328 K/UL (ref 164–446)
PMV BLD AUTO: 11.2 FL (ref 9–12.9)
POTASSIUM SERPL-SCNC: 3.4 MMOL/L (ref 3.6–5.5)
RBC # BLD AUTO: 5.79 M/UL (ref 4.2–5.4)
SCCMEC + MECA PNL NOSE NAA+PROBE: NEGATIVE
SCCMEC + MECA PNL NOSE NAA+PROBE: POSITIVE
SODIUM SERPL-SCNC: 132 MMOL/L (ref 135–145)
WBC # BLD AUTO: 7.1 K/UL (ref 4.8–10.8)

## 2025-07-10 PROCEDURE — 80048 BASIC METABOLIC PNL TOTAL CA: CPT

## 2025-07-10 PROCEDURE — 87640 STAPH A DNA AMP PROBE: CPT | Mod: XU

## 2025-07-10 PROCEDURE — 85027 COMPLETE CBC AUTOMATED: CPT

## 2025-07-10 PROCEDURE — 87641 MR-STAPH DNA AMP PROBE: CPT

## 2025-07-10 PROCEDURE — 36415 COLL VENOUS BLD VENIPUNCTURE: CPT

## 2025-07-10 NOTE — DISCHARGE PLANNING
DISCHARGE PLANNING NOTE - TOTAL JOINT    Procedure: ROBOTIC TOTAL LEFT KNEE ARTHROPLASTY    Procedure Date: 7/16/2025  Insurance: Payor: [facility] MEDICAID / Plan: [place] MEDICAID / Product Type: *No Product type* /    Equipment currently available at home?  crutches, four-wheel walker, and Hand held shower head, needs to get ice packs  Steps into the home? 4  Steps within the home? 0  Toilet height? Standard  Type of shower? walk-in shower  Home Oxygen? No  Portable tank?    Oxygen Provider:  Planning same day discharge: No, potential to spend the night after surgery.    Is Outpatient Physical Therapy set up after surgery? Yes  Did you take the Total Joint Class and where or did you receive an Educational booklet? No- flyer given for class, received NAON book.  Who will be your transportation home on day of discharge? Mauri- s/o    Have you made arrangements to have someone stay with you at home for the first 3 days following discharge, and if so, whom? Mauri- s/o    Have you notified your surgeon that you do not have transportation or someone to help you after discharge? N/A    Are you planning on going to a transitional care facility, for example a skilled nursing facility, post operatively for rehab, and if so, have you contacted your insurance plan to see if they cover this? No      Met with the pt. Pt given a copy of Home Safety Checklist, Equipment Resource Guide, CHG scrub kit and instructions. Expected process in Recovery Room and dc criteria discussed with pt. Pt plans on going home the same day but may spend the night if not able to meet discharge criteria d/t health history. All questions answered and verbalizes understanding of all instructions. No dc needs identified at this time. Anticipate dc to home without barriers. MRSA swab obtained.

## 2025-07-11 ENCOUNTER — RESULTS FOLLOW-UP (OUTPATIENT)
Facility: MEDICAL CENTER | Age: 60
End: 2025-07-11
Payer: MEDICAID

## 2025-07-11 DIAGNOSIS — Z22.321 METHICILLIN-SUSCEPTIBLE STAPHYLOCOCCUS AUREUS COLONIZATION: Primary | ICD-10-CM

## 2025-07-11 RX ORDER — MUPIROCIN 2 %
1 OINTMENT (GRAM) TOPICAL 2 TIMES DAILY
Qty: 22 G | Refills: 0 | Status: SHIPPED | OUTPATIENT
Start: 2025-07-11 | End: 2025-07-16

## 2025-07-14 ENCOUNTER — OFFICE VISIT (OUTPATIENT)
Facility: MEDICAL CENTER | Age: 60
End: 2025-07-14
Payer: MEDICAID

## 2025-07-14 VITALS
HEIGHT: 68 IN | TEMPERATURE: 97.3 F | DIASTOLIC BLOOD PRESSURE: 88 MMHG | BODY MASS INDEX: 25.39 KG/M2 | WEIGHT: 167.55 LBS | HEART RATE: 93 BPM | SYSTOLIC BLOOD PRESSURE: 138 MMHG | OXYGEN SATURATION: 96 %

## 2025-07-14 DIAGNOSIS — M17.32 POST-TRAUMATIC OSTEOARTHRITIS OF LEFT KNEE: Primary | ICD-10-CM

## 2025-07-14 DIAGNOSIS — Z96.652 S/P TKR (TOTAL KNEE REPLACEMENT), LEFT: ICD-10-CM

## 2025-07-14 PROCEDURE — 3079F DIAST BP 80-89 MM HG: CPT | Performed by: ORTHOPAEDIC SURGERY

## 2025-07-14 PROCEDURE — 3075F SYST BP GE 130 - 139MM HG: CPT | Performed by: ORTHOPAEDIC SURGERY

## 2025-07-14 PROCEDURE — 99214 OFFICE O/P EST MOD 30 MIN: CPT | Performed by: ORTHOPAEDIC SURGERY

## 2025-07-14 ASSESSMENT — ENCOUNTER SYMPTOMS
SHORTNESS OF BREATH: 0
MYALGIAS: 0
TINGLING: 0
CHILLS: 0
SENSORY CHANGE: 0
FEVER: 0
WEAKNESS: 0

## 2025-07-14 NOTE — PROGRESS NOTES
[facility] Orthopedic Surgery        HPI: Doris returns for a pre operative visit for planned left robotic total knee this week. She denies any changes to her medical problems, medications or allergies. She picked up her mupirocin for decolonization. She has not scheduled her PT post op, but has the number and will call to schedule when she gets home.     Past Medical History:   Past Medical History:   Diagnosis Date    Compression fracture of L1 lumbar vertebra (HCC)     Compression fracture of L2 lumbar vertebra (HCC)     Cyst of ovary, left     Dental disorder     full upper and lower dentures    GERD (gastroesophageal reflux disease)     H/O:      Heart burn     daily, take Prilosec as needed    Heart murmur     as a child, pt presents no longer present    History of appendectomy     Hypertension     Pain     left knee and lower back    Rheumatic fever 1975    Urinary incontinence        Past Surgical History:  Past Surgical History:   Procedure Laterality Date    ORIF, WRIST Left 2017    Procedure: WRIST ORIF;  Surgeon: Cortez Mccall M.D.;  Location: SURGERY [facility];  Service:     PRIMARY C SECTION      APPENDECTOMY      GYN SURGERY      for ruptured left ovarian cyst       Outpatient Encounter Medications as of 2025   Medication Sig Dispense Refill    mupirocin (BACTROBAN) 2 % Ointment Apply 1 Application topically 2 times a day for 5 days. Apply to inside of bilateral nares twice daily for 5 days. 22 g 0    losartan (COZAAR) 25 MG Tab Take 1 tablet by mouth once daily 30 Tablet 0    omeprazole (PRILOSEC) 20 MG delayed-release capsule Take 20 mg by mouth as needed.      omeprazole (PRILOSEC) 40 MG delayed-release capsule Take 1 capsule by mouth once daily (Patient not taking: Reported on 2025) 30 Capsule 0     No facility-administered encounter medications on file as of 2025.        Allergies:   No Known Allergies    Family History:   No family history on  file.    Social History:   Social History     Tobacco Use   Smoking Status Former    Current packs/day: 0.00    Average packs/day: 0.5 packs/day for 41.9 years (21.0 ttl pk-yrs)    Types: Cigarettes    Start date: 1983    Quit date: 2024    Years since quittin.6   Smokeless Tobacco Never     Social History     Substance and Sexual Activity   Alcohol Use Yes    Comment: 4-5 beers per week     Social History     Substance and Sexual Activity   Drug Use Yes    Types: Inhaled    Comment: marijuana     Social History     Socioeconomic History    Marital status:      Spouse name: Not on file    Number of children: Not on file    Years of education: Not on file    Highest education level: 12th grade   Occupational History    Not on file   Tobacco Use    Smoking status: Former     Current packs/day: 0.00     Average packs/day: 0.5 packs/day for 41.9 years (21.0 ttl pk-yrs)     Types: Cigarettes     Start date: 1983     Quit date: 2024     Years since quittin.6    Smokeless tobacco: Never   Vaping Use    Vaping status: Never Used   Substance and Sexual Activity    Alcohol use: Yes     Comment: 4-5 beers per week    Drug use: Yes     Types: Inhaled     Comment: marijuana    Sexual activity: Not on file   Other Topics Concern    Not on file   Social History Narrative    Not on file     Social Drivers of Health     Financial Resource Strain: Low Risk  (7/10/2025)    Overall Financial Resource Strain (CARDIA)     Difficulty of Paying Living Expenses: Not very hard   Food Insecurity: No Food Insecurity (7/10/2025)    Hunger Vital Sign     Worried About Running Out of Food in the Last Year: Never true     Ran Out of Food in the Last Year: Never true   Transportation Needs: No Transportation Needs (7/10/2025)    PRAPARE - Transportation     Lack of Transportation (Medical): No     Lack of Transportation (Non-Medical): No   Physical Activity: Inactive (7/10/2025)    Exercise Vital Sign     Days of  "Exercise per Week: 0 days     Minutes of Exercise per Session: 0 min   Stress: No Stress Concern Present (7/10/2025)    [employer] - Occupational Stress Questionnaire     Feeling of Stress : Only a little   Social Connections: Socially Isolated (7/10/2025)    Social Connection and Isolation Panel [NHANES]     Frequency of Communication with Friends and Family: Once a week     Frequency of Social Gatherings with Friends and Family: Never     Attends Spiritism Services: Never     Active Member of Clubs or Organizations: No     Attends Club or Organization Meetings: Never     Marital Status:    Intimate Partner Violence: Not on file   Housing Stability: Low Risk  (7/10/2025)    Housing Stability Vital Sign     Unable to Pay for Housing in the Last Year: No     Number of Times Moved in the Last Year: 0     Homeless in the Last Year: No       Review of Systems:  Review of Systems   Constitutional:  Negative for chills and fever.   Respiratory:  Negative for shortness of breath.    Cardiovascular:  Negative for chest pain.   Musculoskeletal:  Positive for joint pain. Negative for myalgias.   Neurological:  Negative for tingling, sensory change and weakness.       Physical Exam:  /88 (BP Location: Right arm, Patient Position: Sitting, BP Cuff Size: Adult)   Pulse 93   Temp 36.3 °C (97.3 °F) (Temporal)   Ht 1.727 m (5' 8\")   Wt 76 kg (167 lb 8.8 oz)   LMP 11/20/2016   SpO2 96%   BMI 25.48 kg/m²     Physical Exam  Constitutional:       General: She is not in acute distress.     Appearance: Normal appearance. She is not ill-appearing.   HENT:      Head: Normocephalic and atraumatic.   Pulmonary:      Effort: Pulmonary effort is normal. No respiratory distress.   Musculoskeletal:      Cervical back: Normal range of motion and neck supple.      Comments: left knee: Overlying skin demonstrates no erythema, ecchymoses or wounds.  There is a mild effusion.  There are well healed " surgical scars. Knee ROM is 0-105.  SILT spn, dpn, plantar and sural nerves. Motor intact to knee extension, ankle dorsiflexion, ankle plantar flexion, and eversion. DP/PT pulse 2+. There is tenderness at the medial and lateral joint line. There is not tenderness elsewhere. positive Lachman. stable varus/valgus stress.   Skin:     General: Skin is warm and dry.      Capillary Refill: Capillary refill takes less than 2 seconds.   Neurological:      General: No focal deficit present.      Mental Status: She is alert and oriented to person, place, and time.   Psychiatric:         Mood and Affect: Mood normal.         Behavior: Behavior normal.       Imaging:  CT left knee demonstrates tricompartmental osteoarthritis with retained hardware in the tibia and femur from prior ACL reconstruction.     Assessment and Plan:  Left knee posttraumatic osteoarthritis    -I again counseled the patient on the above diagnosis and treatment options.  She would like to proceed with left total knee arthroplasty. She will call PT today to schedule her post op therapy.     We discussed pain control post operatively with tylenol, meloxicam, methocarbamol and oxycodone. DVT prophylaxis with aspirin 81 mg twice daily for 1 month.     A full PARQ was held with the patient. Both surgical and non-operative options were discussed. They decided on surgery through shared decision making. We discussed risks include infection, blood loss, damage to neurovascular structures, DVT/PE, chronic pain, instability, fracture, loosening, need for further surgery and unforeseeable events inherent to medical care. They also understand anesthesia will do a separate consent discussing risks inherent to anesthesia. We also discussed life long dental prophylaxis after surgery. Anytime a dental procedure is planned I should be informed to prescribe oral antibiotics prior to the procedure. They verbalized understanding and were in agreement to proceed with robotic  left total knee arthroplasty.       Referrals: physical therapy to start post op  Restrictions: none  New medications/refills: none  Imaging studies: Radiographs: AP, lateral and sunrise left knee  Follow-up: post op    Agustin Garza DO  Orthopedics and Orthopedic Oncology

## 2025-07-15 NOTE — Clinical Note
REFERRAL APPROVAL NOTICE         Sent on July 15, 2025                   Doris Tate  [address] [facility]  [place] [address]                   Dear Ms. Tate,    After a careful review of the medical information and benefit coverage, [facility] has processed your referral. See below for additional details.    If applicable, you must be actively enrolled with your insurance for coverage of the authorized service. If you have any questions regarding your coverage, please contact your insurance directly.    REFERRAL INFORMATION   Referral #:  [id number]  Referred-To Department    Referred-By Provider:  Physical Therapy    Agustin Garza D.O.   Phys Therapy Op [facility]      [address]  [address]  [place] [address]  [phone] [address]  [place] [id number] [phone]    Referral Start Date:  07/15/2025  Referral End Date:   07/15/2026             SCHEDULING  If you do not already have an appointment, please call [phone] to make an appointment.     MORE INFORMATION  If you do not already have a [employer] account, sign up at: [email or web address]  You can access your medical information, make appointments, see lab results, billing information, and more.  If you have questions regarding this referral, please contact  the [facility] Referrals department at:             [phone]. Monday - Friday 8:00AM - 5:00PM.     Sincerely,    [facility]

## 2025-07-16 ENCOUNTER — ANESTHESIA EVENT (OUTPATIENT)
Dept: SURGERY | Facility: MEDICAL CENTER | Age: 60
End: 2025-07-16
Payer: MEDICAID

## 2025-07-16 ENCOUNTER — HOSPITAL ENCOUNTER (OUTPATIENT)
Facility: MEDICAL CENTER | Age: 60
End: 2025-07-16
Attending: ORTHOPAEDIC SURGERY | Admitting: ORTHOPAEDIC SURGERY
Payer: MEDICAID

## 2025-07-16 ENCOUNTER — PHARMACY VISIT (OUTPATIENT)
Dept: PHARMACY | Facility: MEDICAL CENTER | Age: 60
End: 2025-07-16
Payer: COMMERCIAL

## 2025-07-16 ENCOUNTER — APPOINTMENT (OUTPATIENT)
Dept: RADIOLOGY | Facility: MEDICAL CENTER | Age: 60
End: 2025-07-16
Payer: MEDICAID

## 2025-07-16 ENCOUNTER — ANESTHESIA (OUTPATIENT)
Dept: SURGERY | Facility: MEDICAL CENTER | Age: 60
End: 2025-07-16
Payer: MEDICAID

## 2025-07-16 VITALS
DIASTOLIC BLOOD PRESSURE: 94 MMHG | SYSTOLIC BLOOD PRESSURE: 158 MMHG | WEIGHT: 167.55 LBS | TEMPERATURE: 97.5 F | OXYGEN SATURATION: 98 % | BODY MASS INDEX: 25.39 KG/M2 | HEART RATE: 86 BPM | HEIGHT: 68 IN | RESPIRATION RATE: 16 BRPM

## 2025-07-16 DIAGNOSIS — G89.18 POST-OP PAIN: ICD-10-CM

## 2025-07-16 DIAGNOSIS — Z96.652 S/P TOTAL KNEE ARTHROPLASTY, LEFT: Primary | ICD-10-CM

## 2025-07-16 PROBLEM — S52.372A CLOSED GALEAZZI'S FRACTURE OF LEFT RADIUS: Status: RESOLVED | Noted: 2017-04-19 | Resolved: 2025-07-16

## 2025-07-16 PROCEDURE — 770030 HCHG ROOM/CARE - EXTENDED RECOVERY EACH 15 MIN

## 2025-07-16 PROCEDURE — 700102 HCHG RX REV CODE 250 W/ 637 OVERRIDE(OP)

## 2025-07-16 PROCEDURE — 0055T BONE SRGRY CMPTR CT/MRI IMAG: CPT | Mod: AS

## 2025-07-16 PROCEDURE — 700111 HCHG RX REV CODE 636 W/ 250 OVERRIDE (IP): Performed by: ANESTHESIOLOGY

## 2025-07-16 PROCEDURE — C1776 JOINT DEVICE (IMPLANTABLE): HCPCS | Performed by: ORTHOPAEDIC SURGERY

## 2025-07-16 PROCEDURE — 27447 TOTAL KNEE ARTHROPLASTY: CPT | Mod: LT | Performed by: ORTHOPAEDIC SURGERY

## 2025-07-16 PROCEDURE — 97162 PT EVAL MOD COMPLEX 30 MIN: CPT

## 2025-07-16 PROCEDURE — 27447 TOTAL KNEE ARTHROPLASTY: CPT | Mod: AS,LT

## 2025-07-16 PROCEDURE — 700101 HCHG RX REV CODE 250: Performed by: ANESTHESIOLOGY

## 2025-07-16 PROCEDURE — 160031 HCHG SURGERY MINUTES - 1ST 30 MINS LEVEL 5: Performed by: ORTHOPAEDIC SURGERY

## 2025-07-16 PROCEDURE — 160195 HCHG PACU COMPLEX - 1ST 60 MINS: Performed by: ORTHOPAEDIC SURGERY

## 2025-07-16 PROCEDURE — 700101 HCHG RX REV CODE 250: Performed by: ORTHOPAEDIC SURGERY

## 2025-07-16 PROCEDURE — C1713 ANCHOR/SCREW BN/BN,TIS/BN: HCPCS | Performed by: ORTHOPAEDIC SURGERY

## 2025-07-16 PROCEDURE — 20670 REMOVAL IMPLANT SUPERFICIAL: CPT | Performed by: ORTHOPAEDIC SURGERY

## 2025-07-16 PROCEDURE — 73560 X-RAY EXAM OF KNEE 1 OR 2: CPT | Mod: LT

## 2025-07-16 PROCEDURE — 700111 HCHG RX REV CODE 636 W/ 250 OVERRIDE (IP)

## 2025-07-16 PROCEDURE — 97535 SELF CARE MNGMENT TRAINING: CPT

## 2025-07-16 PROCEDURE — 160002 HCHG RECOVERY MINUTES (STAT): Performed by: ORTHOPAEDIC SURGERY

## 2025-07-16 PROCEDURE — RXMED WILLOW AMBULATORY MEDICATION CHARGE

## 2025-07-16 PROCEDURE — 160015 HCHG STAT PREOP MINUTES: Performed by: ORTHOPAEDIC SURGERY

## 2025-07-16 PROCEDURE — 700105 HCHG RX REV CODE 258

## 2025-07-16 PROCEDURE — 700105 HCHG RX REV CODE 258: Performed by: ORTHOPAEDIC SURGERY

## 2025-07-16 PROCEDURE — 700111 HCHG RX REV CODE 636 W/ 250 OVERRIDE (IP): Mod: JZ

## 2025-07-16 PROCEDURE — 0055T BONE SRGRY CMPTR CT/MRI IMAG: CPT | Performed by: ORTHOPAEDIC SURGERY

## 2025-07-16 PROCEDURE — 64447 NJX AA&/STRD FEMORAL NRV IMG: CPT | Performed by: ORTHOPAEDIC SURGERY

## 2025-07-16 PROCEDURE — 160196 HCHG PACU COMPLEX - EA ADDL 30 MINS: Performed by: ORTHOPAEDIC SURGERY

## 2025-07-16 PROCEDURE — 160191 HCHG ANESTHESIA STANDARD: Performed by: ORTHOPAEDIC SURGERY

## 2025-07-16 PROCEDURE — 700111 HCHG RX REV CODE 636 W/ 250 OVERRIDE (IP): Mod: JZ | Performed by: ORTHOPAEDIC SURGERY

## 2025-07-16 PROCEDURE — 160048 HCHG OR STATISTICAL LEVEL 1-5: Performed by: ORTHOPAEDIC SURGERY

## 2025-07-16 PROCEDURE — 160042 HCHG SURGERY MINUTES - EA ADDL 1 MIN LEVEL 5: Performed by: ORTHOPAEDIC SURGERY

## 2025-07-16 PROCEDURE — A9270 NON-COVERED ITEM OR SERVICE: HCPCS

## 2025-07-16 PROCEDURE — 502714 HCHG ROBOTIC SURGERY SERVICES: Performed by: ORTHOPAEDIC SURGERY

## 2025-07-16 DEVICE — CEMENT ORTHOPEDIC HV US (10/PK): Type: IMPLANTABLE DEVICE | Site: KNEE | Status: FUNCTIONAL

## 2025-07-16 DEVICE — BASEPLATE TIBIAL TRIATHLON COCR SIZE 3 (1EA): Type: IMPLANTABLE DEVICE | Site: KNEE | Status: FUNCTIONAL

## 2025-07-16 DEVICE — IMPLANTABLE DEVICE: Type: IMPLANTABLE DEVICE | Site: KNEE | Status: FUNCTIONAL

## 2025-07-16 DEVICE — COMPONENT FEMORAL TRIATHLON CRUCIATE CEMENTED LEFT SIZE 3 (1EA): Type: IMPLANTABLE DEVICE | Site: KNEE | Status: FUNCTIONAL

## 2025-07-16 RX ORDER — ASPIRIN 81 MG/1
81 TABLET ORAL 2 TIMES DAILY
Qty: 60 TABLET | Refills: 0 | Status: SHIPPED | OUTPATIENT
Start: 2025-07-16

## 2025-07-16 RX ORDER — ACETAMINOPHEN 500 MG
1000 TABLET ORAL ONCE
Status: DISCONTINUED | OUTPATIENT
Start: 2025-07-16 | End: 2025-07-16 | Stop reason: HOSPADM

## 2025-07-16 RX ORDER — ACETAMINOPHEN 500 MG
1000 TABLET ORAL EVERY 6 HOURS PRN
Status: DISCONTINUED | OUTPATIENT
Start: 2025-07-21 | End: 2025-07-16 | Stop reason: HOSPADM

## 2025-07-16 RX ORDER — SODIUM CHLORIDE, SODIUM LACTATE, POTASSIUM CHLORIDE, CALCIUM CHLORIDE 600; 310; 30; 20 MG/100ML; MG/100ML; MG/100ML; MG/100ML
INJECTION, SOLUTION INTRAVENOUS CONTINUOUS
Status: DISCONTINUED | OUTPATIENT
Start: 2025-07-16 | End: 2025-07-16 | Stop reason: HOSPADM

## 2025-07-16 RX ORDER — SCOPOLAMINE 1 MG/3D
1 PATCH, EXTENDED RELEASE TRANSDERMAL
Status: DISCONTINUED | OUTPATIENT
Start: 2025-07-16 | End: 2025-07-16 | Stop reason: HOSPADM

## 2025-07-16 RX ORDER — HYDROMORPHONE HYDROCHLORIDE 1 MG/ML
0.2 INJECTION, SOLUTION INTRAMUSCULAR; INTRAVENOUS; SUBCUTANEOUS
Status: DISCONTINUED | OUTPATIENT
Start: 2025-07-16 | End: 2025-07-16 | Stop reason: HOSPADM

## 2025-07-16 RX ORDER — AMOXICILLIN 250 MG
1 CAPSULE ORAL
Status: DISCONTINUED | OUTPATIENT
Start: 2025-07-16 | End: 2025-07-16 | Stop reason: HOSPADM

## 2025-07-16 RX ORDER — BUPIVACAINE HYDROCHLORIDE 5 MG/ML
INJECTION, SOLUTION EPIDURAL; INTRACAUDAL; PERINEURAL PRN
Status: DISCONTINUED | OUTPATIENT
Start: 2025-07-16 | End: 2025-07-16 | Stop reason: SURG

## 2025-07-16 RX ORDER — TRANEXAMIC ACID 100 MG/ML
INJECTION, SOLUTION INTRAVENOUS PRN
Status: DISCONTINUED | OUTPATIENT
Start: 2025-07-16 | End: 2025-07-16 | Stop reason: SURG

## 2025-07-16 RX ORDER — BUPIVACAINE HYDROCHLORIDE AND EPINEPHRINE 2.5; 5 MG/ML; UG/ML
INJECTION, SOLUTION EPIDURAL; INFILTRATION; INTRACAUDAL; PERINEURAL
Status: DISCONTINUED | OUTPATIENT
Start: 2025-07-16 | End: 2025-07-16 | Stop reason: HOSPADM

## 2025-07-16 RX ORDER — ASPIRIN 81 MG/1
81 TABLET ORAL 2 TIMES DAILY
Status: DISCONTINUED | OUTPATIENT
Start: 2025-07-17 | End: 2025-07-16 | Stop reason: HOSPADM

## 2025-07-16 RX ORDER — HYDROMORPHONE HYDROCHLORIDE 1 MG/ML
0.1 INJECTION, SOLUTION INTRAMUSCULAR; INTRAVENOUS; SUBCUTANEOUS
Status: DISCONTINUED | OUTPATIENT
Start: 2025-07-16 | End: 2025-07-16 | Stop reason: HOSPADM

## 2025-07-16 RX ORDER — MIDAZOLAM HYDROCHLORIDE 1 MG/ML
1 INJECTION INTRAMUSCULAR; INTRAVENOUS
Status: DISCONTINUED | OUTPATIENT
Start: 2025-07-16 | End: 2025-07-16 | Stop reason: HOSPADM

## 2025-07-16 RX ORDER — SODIUM PHOSPHATE,MONO-DIBASIC 19G-7G/118
1 ENEMA (ML) RECTAL
Status: DISCONTINUED | OUTPATIENT
Start: 2025-07-16 | End: 2025-07-16 | Stop reason: HOSPADM

## 2025-07-16 RX ORDER — OXYCODONE HYDROCHLORIDE 5 MG/1
5 TABLET ORAL
Status: DISCONTINUED | OUTPATIENT
Start: 2025-07-16 | End: 2025-07-16 | Stop reason: HOSPADM

## 2025-07-16 RX ORDER — BUPIVACAINE HYDROCHLORIDE 2.5 MG/ML
INJECTION, SOLUTION EPIDURAL; INFILTRATION; INTRACAUDAL; PERINEURAL PRN
Status: DISCONTINUED | OUTPATIENT
Start: 2025-07-16 | End: 2025-07-16 | Stop reason: SURG

## 2025-07-16 RX ORDER — HYDRALAZINE HYDROCHLORIDE 20 MG/ML
5 INJECTION INTRAMUSCULAR; INTRAVENOUS
Status: DISCONTINUED | OUTPATIENT
Start: 2025-07-16 | End: 2025-07-16 | Stop reason: HOSPADM

## 2025-07-16 RX ORDER — IBUPROFEN 400 MG/1
800 TABLET, FILM COATED ORAL 3 TIMES DAILY PRN
Status: DISCONTINUED | OUTPATIENT
Start: 2025-07-19 | End: 2025-07-16 | Stop reason: HOSPADM

## 2025-07-16 RX ORDER — HYDROMORPHONE HYDROCHLORIDE 1 MG/ML
0.4 INJECTION, SOLUTION INTRAMUSCULAR; INTRAVENOUS; SUBCUTANEOUS
Status: DISCONTINUED | OUTPATIENT
Start: 2025-07-16 | End: 2025-07-16 | Stop reason: HOSPADM

## 2025-07-16 RX ORDER — KETOROLAC TROMETHAMINE 15 MG/ML
15 INJECTION, SOLUTION INTRAMUSCULAR; INTRAVENOUS EVERY 6 HOURS
Status: DISCONTINUED | OUTPATIENT
Start: 2025-07-16 | End: 2025-07-16 | Stop reason: HOSPADM

## 2025-07-16 RX ORDER — OXYCODONE HCL 5 MG/5 ML
10 SOLUTION, ORAL ORAL
Status: DISCONTINUED | OUTPATIENT
Start: 2025-07-16 | End: 2025-07-16 | Stop reason: HOSPADM

## 2025-07-16 RX ORDER — DIPHENHYDRAMINE HYDROCHLORIDE 50 MG/ML
12.5 INJECTION, SOLUTION INTRAMUSCULAR; INTRAVENOUS
Status: DISCONTINUED | OUTPATIENT
Start: 2025-07-16 | End: 2025-07-16 | Stop reason: HOSPADM

## 2025-07-16 RX ORDER — HALOPERIDOL 5 MG/ML
1 INJECTION INTRAMUSCULAR EVERY 6 HOURS PRN
Status: DISCONTINUED | OUTPATIENT
Start: 2025-07-16 | End: 2025-07-16 | Stop reason: HOSPADM

## 2025-07-16 RX ORDER — LIDOCAINE HYDROCHLORIDE 10 MG/ML
INJECTION, SOLUTION EPIDURAL; INFILTRATION; INTRACAUDAL; PERINEURAL
Status: DISCONTINUED
Start: 2025-07-16 | End: 2025-07-16 | Stop reason: HOSPADM

## 2025-07-16 RX ORDER — EPHEDRINE SULFATE 50 MG/ML
5 INJECTION, SOLUTION INTRAVENOUS
Status: DISCONTINUED | OUTPATIENT
Start: 2025-07-16 | End: 2025-07-16 | Stop reason: HOSPADM

## 2025-07-16 RX ORDER — DEXAMETHASONE SODIUM PHOSPHATE 4 MG/ML
4 INJECTION, SOLUTION INTRA-ARTICULAR; INTRALESIONAL; INTRAMUSCULAR; INTRAVENOUS; SOFT TISSUE
Status: DISCONTINUED | OUTPATIENT
Start: 2025-07-16 | End: 2025-07-16 | Stop reason: HOSPADM

## 2025-07-16 RX ORDER — CEFUROXIME SODIUM 750 MG/1
INJECTION, POWDER, FOR SOLUTION INTRAMUSCULAR; INTRAVENOUS
Status: DISCONTINUED | OUTPATIENT
Start: 2025-07-16 | End: 2025-07-16 | Stop reason: HOSPADM

## 2025-07-16 RX ORDER — HYDROMORPHONE HYDROCHLORIDE 1 MG/ML
0.5 INJECTION, SOLUTION INTRAMUSCULAR; INTRAVENOUS; SUBCUTANEOUS
Status: DISCONTINUED | OUTPATIENT
Start: 2025-07-16 | End: 2025-07-16 | Stop reason: HOSPADM

## 2025-07-16 RX ORDER — CELECOXIB 200 MG/1
200 CAPSULE ORAL ONCE
Status: DISCONTINUED | OUTPATIENT
Start: 2025-07-16 | End: 2025-07-16 | Stop reason: HOSPADM

## 2025-07-16 RX ORDER — METHOCARBAMOL 500 MG/1
1000 TABLET, FILM COATED ORAL 3 TIMES DAILY
Qty: 42 TABLET | Refills: 0 | Status: SHIPPED | OUTPATIENT
Start: 2025-07-16 | End: 2025-07-23

## 2025-07-16 RX ORDER — MEPERIDINE HYDROCHLORIDE 25 MG/ML
12.5 INJECTION INTRAMUSCULAR; INTRAVENOUS; SUBCUTANEOUS
Status: DISCONTINUED | OUTPATIENT
Start: 2025-07-16 | End: 2025-07-16 | Stop reason: HOSPADM

## 2025-07-16 RX ORDER — DEXAMETHASONE SODIUM PHOSPHATE 4 MG/ML
INJECTION, SOLUTION INTRA-ARTICULAR; INTRALESIONAL; INTRAMUSCULAR; INTRAVENOUS; SOFT TISSUE PRN
Status: DISCONTINUED | OUTPATIENT
Start: 2025-07-16 | End: 2025-07-16 | Stop reason: SURG

## 2025-07-16 RX ORDER — EPINEPHRINE 1 MG/ML(1)
AMPUL (ML) INJECTION
Status: DISCONTINUED | OUTPATIENT
Start: 2025-07-16 | End: 2025-07-16 | Stop reason: HOSPADM

## 2025-07-16 RX ORDER — METOPROLOL TARTRATE 1 MG/ML
1 INJECTION, SOLUTION INTRAVENOUS
Status: DISCONTINUED | OUTPATIENT
Start: 2025-07-16 | End: 2025-07-16 | Stop reason: HOSPADM

## 2025-07-16 RX ORDER — BISACODYL 10 MG
10 SUPPOSITORY, RECTAL RECTAL
Status: DISCONTINUED | OUTPATIENT
Start: 2025-07-16 | End: 2025-07-16 | Stop reason: HOSPADM

## 2025-07-16 RX ORDER — OXYCODONE HCL 5 MG/5 ML
5 SOLUTION, ORAL ORAL
Status: DISCONTINUED | OUTPATIENT
Start: 2025-07-16 | End: 2025-07-16 | Stop reason: HOSPADM

## 2025-07-16 RX ORDER — POLYETHYLENE GLYCOL 3350 17 G/17G
1 POWDER, FOR SOLUTION ORAL 2 TIMES DAILY PRN
Status: DISCONTINUED | OUTPATIENT
Start: 2025-07-16 | End: 2025-07-16 | Stop reason: HOSPADM

## 2025-07-16 RX ORDER — ALBUTEROL SULFATE 5 MG/ML
2.5 SOLUTION RESPIRATORY (INHALATION)
Status: DISCONTINUED | OUTPATIENT
Start: 2025-07-16 | End: 2025-07-16 | Stop reason: HOSPADM

## 2025-07-16 RX ORDER — MELOXICAM 15 MG/1
15 TABLET ORAL DAILY
Qty: 30 TABLET | Refills: 0 | Status: SHIPPED | OUTPATIENT
Start: 2025-07-16

## 2025-07-16 RX ORDER — BUPIVACAINE HYDROCHLORIDE 2.5 MG/ML
INJECTION, SOLUTION EPIDURAL; INFILTRATION; INTRACAUDAL; PERINEURAL PRN
Status: DISCONTINUED | OUTPATIENT
Start: 2025-07-16 | End: 2025-07-16

## 2025-07-16 RX ORDER — SODIUM CHLORIDE, SODIUM LACTATE, POTASSIUM CHLORIDE, CALCIUM CHLORIDE 600; 310; 30; 20 MG/100ML; MG/100ML; MG/100ML; MG/100ML
INJECTION, SOLUTION INTRAVENOUS CONTINUOUS
Status: ACTIVE | OUTPATIENT
Start: 2025-07-16 | End: 2025-07-16

## 2025-07-16 RX ORDER — AMOXICILLIN 250 MG
1 CAPSULE ORAL NIGHTLY
Status: DISCONTINUED | OUTPATIENT
Start: 2025-07-16 | End: 2025-07-16 | Stop reason: HOSPADM

## 2025-07-16 RX ORDER — ONDANSETRON 2 MG/ML
4 INJECTION INTRAMUSCULAR; INTRAVENOUS
Status: DISCONTINUED | OUTPATIENT
Start: 2025-07-16 | End: 2025-07-16 | Stop reason: HOSPADM

## 2025-07-16 RX ORDER — ACETAMINOPHEN 500 MG
1000 TABLET ORAL EVERY 6 HOURS
Status: DISCONTINUED | OUTPATIENT
Start: 2025-07-16 | End: 2025-07-16 | Stop reason: HOSPADM

## 2025-07-16 RX ORDER — MIDAZOLAM HYDROCHLORIDE 1 MG/ML
INJECTION INTRAMUSCULAR; INTRAVENOUS PRN
Status: DISCONTINUED | OUTPATIENT
Start: 2025-07-16 | End: 2025-07-16 | Stop reason: SURG

## 2025-07-16 RX ORDER — OXYCODONE HYDROCHLORIDE 10 MG/1
10 TABLET ORAL
Status: DISCONTINUED | OUTPATIENT
Start: 2025-07-16 | End: 2025-07-16 | Stop reason: HOSPADM

## 2025-07-16 RX ORDER — DOCUSATE SODIUM 100 MG/1
100 CAPSULE, LIQUID FILLED ORAL 2 TIMES DAILY
Status: DISCONTINUED | OUTPATIENT
Start: 2025-07-16 | End: 2025-07-16 | Stop reason: HOSPADM

## 2025-07-16 RX ORDER — HALOPERIDOL 5 MG/ML
1 INJECTION INTRAMUSCULAR
Status: DISCONTINUED | OUTPATIENT
Start: 2025-07-16 | End: 2025-07-16 | Stop reason: HOSPADM

## 2025-07-16 RX ORDER — CEFAZOLIN SODIUM 1 G/3ML
INJECTION, POWDER, FOR SOLUTION INTRAMUSCULAR; INTRAVENOUS PRN
Status: DISCONTINUED | OUTPATIENT
Start: 2025-07-16 | End: 2025-07-16 | Stop reason: SURG

## 2025-07-16 RX ORDER — ONDANSETRON 2 MG/ML
4 INJECTION INTRAMUSCULAR; INTRAVENOUS EVERY 4 HOURS PRN
Status: DISCONTINUED | OUTPATIENT
Start: 2025-07-16 | End: 2025-07-16 | Stop reason: HOSPADM

## 2025-07-16 RX ORDER — DIPHENHYDRAMINE HYDROCHLORIDE 50 MG/ML
25 INJECTION, SOLUTION INTRAMUSCULAR; INTRAVENOUS EVERY 6 HOURS PRN
Status: DISCONTINUED | OUTPATIENT
Start: 2025-07-16 | End: 2025-07-16 | Stop reason: HOSPADM

## 2025-07-16 RX ORDER — OXYCODONE HYDROCHLORIDE 5 MG/1
5 TABLET ORAL EVERY 4 HOURS PRN
Qty: 40 TABLET | Refills: 0 | Status: SHIPPED | OUTPATIENT
Start: 2025-07-16 | End: 2025-07-23

## 2025-07-16 RX ORDER — LABETALOL HYDROCHLORIDE 5 MG/ML
5 INJECTION, SOLUTION INTRAVENOUS
Status: DISCONTINUED | OUTPATIENT
Start: 2025-07-16 | End: 2025-07-16 | Stop reason: HOSPADM

## 2025-07-16 RX ADMIN — DEXAMETHASONE SODIUM PHOSPHATE 1.3 MG: 4 INJECTION INTRA-ARTICULAR; INTRALESIONAL; INTRAMUSCULAR; INTRAVENOUS; SOFT TISSUE at 06:52

## 2025-07-16 RX ADMIN — TRANEXAMIC ACID 1000 MG: 100 INJECTION, SOLUTION INTRAVENOUS at 07:12

## 2025-07-16 RX ADMIN — BUPIVACAINE HYDROCHLORIDE 20 ML: 2.5 INJECTION, SOLUTION EPIDURAL; INFILTRATION; INTRACAUDAL at 06:52

## 2025-07-16 RX ADMIN — TRANEXAMIC ACID 1000 MG: 100 INJECTION, SOLUTION INTRAVENOUS at 08:43

## 2025-07-16 RX ADMIN — ACETAMINOPHEN 1000 MG: 500 TABLET, FILM COATED ORAL at 15:20

## 2025-07-16 RX ADMIN — FENTANYL CITRATE 50 MCG: 50 INJECTION, SOLUTION INTRAMUSCULAR; INTRAVENOUS at 06:46

## 2025-07-16 RX ADMIN — CEFAZOLIN 2 G: 1 INJECTION, POWDER, FOR SOLUTION INTRAMUSCULAR; INTRAVENOUS at 07:12

## 2025-07-16 RX ADMIN — MIDAZOLAM HYDROCHLORIDE 2 MG: 1 INJECTION, SOLUTION INTRAMUSCULAR; INTRAVENOUS at 06:46

## 2025-07-16 RX ADMIN — PROPOFOL 100 MCG/KG/MIN: 10 INJECTION, EMULSION INTRAVENOUS at 07:11

## 2025-07-16 RX ADMIN — BUPIVACAINE HYDROCHLORIDE 2.5 ML: 5 INJECTION, SOLUTION EPIDURAL; INTRACAUDAL at 07:07

## 2025-07-16 RX ADMIN — SODIUM CHLORIDE, POTASSIUM CHLORIDE, SODIUM LACTATE AND CALCIUM CHLORIDE: 600; 310; 30; 20 INJECTION, SOLUTION INTRAVENOUS at 06:57

## 2025-07-16 RX ADMIN — CEFAZOLIN 2 G: 2 INJECTION, POWDER, FOR SOLUTION INTRAVENOUS at 16:36

## 2025-07-16 ASSESSMENT — COGNITIVE AND FUNCTIONAL STATUS - GENERAL
CLIMB 3 TO 5 STEPS WITH RAILING: A LITTLE
SUGGESTED CMS G CODE MODIFIER MOBILITY: CK
STANDING UP FROM CHAIR USING ARMS: A LITTLE
MOVING TO AND FROM BED TO CHAIR: A LITTLE
TURNING FROM BACK TO SIDE WHILE IN FLAT BAD: A LITTLE
MOVING FROM LYING ON BACK TO SITTING ON SIDE OF FLAT BED: A LITTLE
WALKING IN HOSPITAL ROOM: A LITTLE
MOBILITY SCORE: 18

## 2025-07-16 ASSESSMENT — PATIENT HEALTH QUESTIONNAIRE - PHQ9
SUM OF ALL RESPONSES TO PHQ9 QUESTIONS 1 AND 2: 0
1. LITTLE INTEREST OR PLEASURE IN DOING THINGS: NOT AT ALL
2. FEELING DOWN, DEPRESSED, IRRITABLE, OR HOPELESS: NOT AT ALL

## 2025-07-16 ASSESSMENT — PAIN DESCRIPTION - PAIN TYPE
TYPE: SURGICAL PAIN
TYPE: ACUTE PAIN;SURGICAL PAIN
TYPE: SURGICAL PAIN
TYPE: CHRONIC PAIN;ACUTE PAIN
TYPE: SURGICAL PAIN

## 2025-07-16 ASSESSMENT — GAIT ASSESSMENTS
GAIT LEVEL OF ASSIST: SUPERVISED
DISTANCE (FEET): 120
ASSISTIVE DEVICE: FRONT WHEEL WALKER
DEVIATION: ANTALGIC;STEP TO;DECREASED HEEL STRIKE;DECREASED TOE OFF

## 2025-07-16 ASSESSMENT — PAIN SCALES - GENERAL: PAIN_LEVEL: 0

## 2025-07-16 NOTE — ANESTHESIA PREPROCEDURE EVALUATION
Case: [id number] Date/Time: 07/16/25 0645    Procedure: ROBOTIC TOTAL LEFT KNEE ARTHROPLASTY    Pre-op diagnosis: LEFT KNEE OSTEOARTHRITIS    Location:  OR 06 / SURGERY [facility]    Surgeons: Agustin Garza D.O.            Relevant Problems   No relevant active problems       Physical Exam    Airway   Mallampati: II  TM distance: >3 FB  Neck ROM: full       Cardiovascular - normal exam  Rhythm: regular  Rate: normal    (-) murmur     Dental - normal exam    (+) upper dentures, lower dentures           Pulmonary - normal examBreath sounds clear to auscultation     Abdominal    Neurological - normal exam                   Anesthesia Plan    ASA 2       Plan - spinal and peripheral nerve block   Neuraxial block will be primary anesthetic  Peripheral nerve block will be post-op pain control              Postoperative Plan: Postoperative administration of opioids is intended.    Pertinent diagnostic labs and testing reviewed    Informed Consent:    Anesthetic plan and risks discussed with patient.

## 2025-07-16 NOTE — OR NURSING
"0921 Pt arrived to PACU from OR. VSS. 2L NC in place. Pt is resting comfortably. Non-labored respirations. Pt is spontaneous breathing. Pt  unable to follow commands.  No symptoms or complaints regarding pain or nausea at this time. Pt unable to feel or wiggle from waist down due to spinal anesthesia.     0930 Pt is tolerating sips. Pt on RA, oxygen sat 94%.     0932 Called [facility] for RXs. They'll deliver at 1100.    0935 Placed Purewick.    0937 Called Mauri wise for updates.     1007 Pt meets criteria for hospital room transfer. Unable to move or wiggle lower extremities. No sensation below T10.    1043 Pt is resting. No distress. Awaiting room placement. No change to sensation.     1100 [facility] delivered.     1116 Handoff to ADI Rose    1134 Assumed pt care from ADI Rose.    1142 Pt voiding via purewick 50mL. Pt reports \"itchy feeling in right foot.\" Assisted pt and repositioned legs.     1210 Called Mauri for updates regarding more time needed and extend stay.       1215 Sligh improvement from spinal anesthesia, trace movement BLE.     1222 Notified Dr Morgan regarding pt status with sensation from spinal anesthesia and extended recovery need.     1235 Pt report \"hungry\". Offered crackers. Pt declined and states will wait until she moves to a room.    1310 Pt reports right foot itchy. Assisted with scratching. Purewick emptied another 50mL.     1315 Pt able to bend right knee and wiggle feet. Unable to lift either leg. Pt able to scratch own right foot.     1335 Report to ADI Mares for ADI Serrato who is at lunch. Pt ready to move to hospital room for extended stay.     1344 Transport notified pt ready to move.     1355 Pt discharged via transport to room.     1356 Called Mauri for room update.                        "

## 2025-07-16 NOTE — OR NURSING
1116- received report from Ninfa JOSHI.  Pt resting. VSS.  1130- VSS. Dallas provided for comfort.  1134- Report to Ninfa JOSHI.

## 2025-07-16 NOTE — OP REPORT
Date of Operation: 7/16/2025    Preoperative Diagnosis:   left knee post traumatic osteoarthritis  Retained orthopedic hardware left tibia     Postoperative Diagnosis: Same    Indications: This is a 60 y.o. female with left knee osteoarthritis. She has tried conservative treatment which has failed to adequately improve symptoms. After discussion of treatment options they wished to proceed with left total knee arthroplasty and hardware removal left tibia. A full PARQ was held preoperatively and they wished to proceed.    Operative Procedure:    Robotic  left total knee arthroplasty  Hardware removal left tibia    Surgeon: Agustin Garza D.O.    Assistant:  Mauri Nails PA-C  A surgical assistant in this surgery was indicated due to the complexity of the procedure.  Their role included aiding in the positioning, approach, retraction, holding of devices, manipulation of the limb and closure of wounds.    Anesthesia: Spinal w/adductor block      Estimated Blood Loss:  150 ml    Specimens: None    Drains: None     Implants:   Froylan Triathlon   Femur left size 3 CR  Universal tibial baseplate size 3  11 mm CS poly insert  31 mm symmetric patella    Removed tibial screw and washer    Approach: Medial parapatellar     Findings:   severe patellofemoral compartment degenerative changes  severe lateral compartment degenerative changes  severe medial compartment degenerative changes    Stability: Stable to varus/valgus and anterior/posterior stresses. Good patellar tracking in the trochlea.    Complications: none      DESCRIPTION OF PROCEDURE:     The patient was transferred to the operating table and placed in the supine position. Pressure points were padded and the patient was secured to the table. Sequential compression garments were applied to the lower extremities. Spinal anesthesia was induced and then the patient was sedated by the anesthesia team. A sandbag was secured to the table to rest the foot on  when the operative leg is moved to flexion. Pressure points were padded and the patient was secured to the table. The left lower extremity was prepped and draped in standard fashion. A time out was performed. Ancef was given as prophylaxis. One gram of tranexamic acid was given prior to tourniquet insufflation and incision.     A standard medial parapatellar approach to the knee was marked out using palpable landmarks. The leg was elevated and exsanguinated with a tourniquet. The tourniquet was set to 250 mmHg. The knee was then flexed and incision made. Dissection was carried through skin and subcutaneous tissue and full thickness flaps were developed. The joint was then entered using a fresh scalpel cutting through the quad tendon in line with it's fibers and going medially along the patella leaving a cuff of tissue for later repair. A cuff of medial proximal tibia periosteum and soft tissue was then elevated to expose the underlying bone. A plane between the patellar tendon and fat pad was identified and the fat pad was excised.  The femoral pins were then placed approximately one fingerbreadth anterior and one fingerbreadth superior to the medial epicondyle.  The tibial pins were then placed approximately 3-4 cm distal to the joint line at the anterior medial tibia.  The arrays were attached to the femoral and tibial pins.    The knee was extended  and a Carlos clamp was placed around the quad tendon insertion on the patella to jayden the patella. A second clamp was placed on the patellar tendon insertion on the patella. A caliper was then used to measure the thickness of the patella. An oscillating saw was used to prepare the patella resecting the thickness of bone and cartilage to be replaced by the patellar button. The patella was then sized and the pegs were drilled through the guide placing the component more medially for better tracking. A trial patella was placed and any remaining osteophytes were removed  with a rongeur. The trial button was removed and a plate protector was placed.     The patella was then subluxed laterally and the knee flexed. Retractors were placed.  The femur and tibia were then mapped out using the array.  The ACL and PCL were transected at their femoral insertions.  The varus and valgus stress was then applied in extension and flexion to gauge laxity using the [employer] robot.  The plan for implant position was then fine-tuned on the robot. The screw in the tibia was then identified and removed with a drive as well as the washer. The robot was brought in. The tibia cut was then made.  Tension was then turned to the femur.  The anterior, posterior, distal and chamfer cuts were made. Remaining osteophytes were removed with a rongeur.    Retractors were then repositioned and the tibia was subluxed anteriorly. With the knee flexed a laminar  was placed in the lateral compartment to excise remaining meniscal tissue and posterior osteophytes on the distal femur. The laminar  was then repositioned to the medial compartment and in the same fashion remaining lateral meniscal tissue and posterior osteophytes on the femur were removed.     Retractors were then repositioned and the tibia again subluxed anteriorly. The tibia was sized using the tibial base plate templates. The appropriate size tibia base plate was then placed and the canal was prepared for the stem and keel fins through the guide. The retractors were removed and the tibia reduced under the femur. The femoral trial was placed followed by the trial tibial insert and patella button. The knee was then checked for range of motion and stability. The trial components were then removed. The femur, tibia and patella were then copiously irrigated and then dried while cement was mixed on the back table. The knee was then flexed and retractors were placed subluxing the tibia. Cement was placed on the tibial plateau and undersurface of the  tibial base plate. The tibial component was then impacted and cemented in place. The posterior retractor was removed and the tibia reduced under the femur. Cement was placed on the distal femur and under surface of the femoral component. The femoral component was then impacted and cemented in place. The trial tibial insert was placed and the knee brought to extension. The patella was everted and cement was placed on the patellar surface and the patellar button which was then clamped into place. The knee was held in extension while cement cured and excess cement that extruded was cleared. After the cement had cleared the knee was again checked for range of motion and tracking. Once the final insert size was selected the trial insert was removed. The tourniquet was released. The periosteum, capsule and soft tissues were injected with a joint cocktail local anesthetic. Hemostasis was achieved. The wound was again copiously irrigated. The final tibial polyethylene insert was placed. 1.5 grams of cefuroxime powder was placed deep within the wound.     The wound was closed in layers using #2 FiberWire and #1 Quill to close the capsule.  2-0 Vicryl was used to close the dermis and 3-0 Monocryl for the skin.  A Prineo dressing was placed over the top.  Sterile dressings and an ACE wrap were applied.     The patient was then awakened and transferred to the post-anesthesia care unit in stable condition.     Disposition: After recovery in the post anesthesia care unit the patient will work with physical therapy and if they pass will be discharged home, but if they do not pass therapy or there are other concerns they will be admitted for observation. Weight bearing restrictions: weight bearing as tolerated. They will receive one dose of ancef post op. DVT prophylaxis will be initiated post operative day 1.     Agustin Garza DO  Orthopedic Surgery and Orthopedic Oncology

## 2025-07-16 NOTE — DISCHARGE PLANNING
Met with pt who said that she has a ride home. No dc concerns. RN asked CM to get choice for walker. Pt signed choice and CM faxed to [facility].

## 2025-07-16 NOTE — THERAPY
Physical Therapy   Initial Evaluation     Patient Name:  Doris Tate  Age:  60 y.o., Sex:  female  Medical Record #:  [id number]  Today's Date: 7/16/2025     Precautions  Medical: Fall Risk  Weight Bearing: Weight Bearing as Tolerated Left Lower Extremity    Assessment  Patient is a 60 y/o female who was seen s/p left TKA with WBAT orders for left lower extremity.   Pt was agreeable to therapy evaluation and presented with safe upright functional mobility with AD use after therapy session  . Pt was provided with education on elevation, icing, positioning, and supine/seated therapeutic exercises.   Pt was able to return demo safe gait mechanics with use of AD on flat level surfaces with appropriate heel to toe gait mechanics.   Pt was able to return demo safe mechanics in order to navigate stairs with use of bilateral rails.  Pt was able to demonstrate safe use of AD during transfers/transitions and general locomotion with no jairo LOB. Pt has no acute skilled PT needs at this time, anticipate pt to d/c home once medically clear with recs for FWW use and OP therapy services.     The pt was provided with the following skilled therapy txt: Pt was provided with VC, demo, and facilitation during gait training in order to return demo terminal knee extension and appropriate heel strike/flexion of knee in order to return demo appropriate heel to toe gait mechanics. Pt was provided with demo and VC to go up/down steps with safe mechanics with use of AD. Pt was provided with VC and TC for appropriate quad contraction and mechanics during supine/seated therapeutic exercises. Pt was provided with education on intensity, frequency, and duration of exercises.     Plan         DC Equipment Recommendations: (P) Front-Wheel Walker  Discharge Recommendations: (P) Recommend outpatient physical therapy services to address higher level deficits         Initial Contact Note    Initial Contact Note Order Received and Verified, Physical  Therapy Evaluation in Progress with Full Report to Follow.   Precautions   Medical Fall Risk   Weight Bearing Weight Bearing as Tolerated Left Lower Extremity   Pain 0 - 10 Group   Location Knee   Location Orientation Left   Pain Rating Scale (NPRS) 7   Therapist Pain Assessment During Activity;8   Prior Living Situation   Prior Services Home-Independent   Housing / Facility Mobile Home   Steps Into Home 4   Steps In Home 0   Rail Both Rail (Steps into Home)   Equipment Owned 4-Wheel Walker   Lives with - Patient's Self Care Capacity Significant Other   History of Falls   History of Falls No   Cognition    Level of Consciousness Alert   Active ROM Upper Body   Active ROM Upper Body  WDL   Active ROM Lower Body    Active ROM Lower Body  WDL   Comments LLE:  ROM 0-90 in sitting   Strength Lower Body   Comments LLE: able to perform SLR,   Sensation Lower Body   Lower Extremity Sensation   WDL   Other Treatments   Other Treatments Provided post FTKA education completed. Will need FWW for home use for safety   Balance Assessment   Sitting Balance (Static) Good   Sitting Balance (Dynamic) Good   Standing Balance (Static) Fair +   Standing Balance (Dynamic) Fair   Weight Shift Sitting Good   Weight Shift Standing Fair   Comments w/FWW   Bed Mobility    Supine to Sit Independent   Sit to Supine Independent   Gait Analysis   Gait Level Of Assist Supervised   Assistive Device Front Wheel Walker   Distance (Feet) 120   # of Times Distance was Traveled 2   Deviation Antalgic;Step To;Decreased Heel Strike;Decreased Toe Off   # of Stairs Climbed 2   Level of Assist with Stairs Supervised   Weight Bearing Status WBAT   Comments cues for sequencing on steps, issued handout with sequencing   Functional Mobility   Sit to Stand Supervised   Bed, Chair, Wheelchair Transfer Supervised   Transfer Method Stand Step   Mobility room to gym then BTB for exercise   6 Clicks Assessment - How much HELP from from another person do you currently  need... (If the patient hasn't done an activity recently, how much help from another person do you think he/she would need if he/she tried?)   Turning from your back to your side while in a flat bed without using bedrails? 3   Moving from lying on your back to sitting on the side of a flat bed without using bed rails? 3   Moving to and from a bed to a chair (including a wheelchair)? 3   Standing up from a chair using your arms (e.g., wheelchair, or bedside chair)? 3   Walking in hospital room? 3   Climbing 3-5 steps with a railing? 3   6 clicks Mobility Score 18   Education Group   Education Provided Role of Physical Therapist;Gait Training;Stair Training;Use of Assistive Device;Exercises - Supine;Exercises - Seated;Weight Bearing Status   Role of Physical Therapist Patient Response Patient;Acceptance;Explanation;Verbal Demonstration   Gait Training Patient Response Patient;Acceptance;Explanation;Verbal Demonstration;Action Demonstration   Stair Training Patient Response Patient;Acceptance;Explanation;Demonstration;Action Demonstration   Use of Assistive Device Patient Response Patient;Acceptance;Explanation;Verbal Demonstration;Action Demonstration   Exercises - Supine Patient Response Patient;Eager;Explanation;Demonstration;Handout;Action Demonstration;Verbal Demonstration   Exercises - Seated Patient Response Patient;Acceptance;Explanation;Demonstration;Handout;Action Demonstration;Verbal Demonstration   Weight Bearing Status Patient Response Patient;Acceptance;Explanation;Verbal Demonstration   Anticipated Discharge Equipment and Recommendations   DC Equipment Recommendations Front-Wheel Walker   Discharge Recommendations Recommend outpatient physical therapy services to address higher level deficits   Interdisciplinary Plan of Care Collaboration   IDT Collaboration with  Nursing   Patient Position at End of Therapy In Bed;Call Light within Reach;Tray Table within Reach;Phone within Reach  (polar ice to knee)    Collaboration Comments staff updated. pt will need a FWW for safety with ambulation at home.   Session Information   Date / Session Number  7/16 Eval and education completed.

## 2025-07-16 NOTE — ANESTHESIA PROCEDURE NOTES
Peripheral Block    Date/Time: 7/16/2025 6:47 AM    Performed by: Jc Morgan M.D.  Authorized by: Jc Morgan M.D.    Patient Location:  Pre-op  Start Time:  7/16/2025 6:47 AM  End Time:  7/16/2025 6:52 AM  Reason for Block: at surgeon's request and post-op pain management ONLY    patient identified, IV checked, site marked, risks and benefits discussed, surgical consent, monitors and equipment checked, pre-op evaluation and timeout performed    Patient Position:  Supine  Prep: ChloraPrep    Monitoring:  Heart rate, continuous pulse ox and cardiac monitor  Block Region:  Lower Extremity  Lower Extremity - Block Type:  Selective FEMORAL nerve block at the Adductor Canal    Laterality:  Left  Procedures: ultrasound guided  Image captured, interpreted and electronically stored.  Local Infiltration:  Lidocaine  Strength:  1 %  Dose:  3 ml  Block Type:  Single-shot  Needle Length:  100mm  Needle Gauge:  21 G  Needle Localization:  Ultrasound guidance  Ultrasound picture in chart  Injection Assessment:  No paresthesia on injection, incremental injection and local visualized surrounding nerve on ultrasound (on initial aspiration, blood aspirated. Needle removed and flushed, then readvanced into a perineural position. Repeat aspiration with no heme)  Evidence of intravascular injection: No     US Guided Selective Femoral Nerve Block at Adductor Canal:   US probe placed at mid-thigh level on externally rotated leg and femur identified.  Probe directed medially until Sartorius Muscle (SM), Femoral Artery (FA) and Saphenous Nerve (SN) identified in Adductor Canal (AC).  Needle inserted anterolateral to probe in an in plane approach into a subsartorial perivascular perineural position.  After negative aspiration LA injected with ease and visualized spreading within the AC.

## 2025-07-16 NOTE — ANESTHESIA POSTPROCEDURE EVALUATION
Patient: Doris Tate    Procedure Summary       Date: 07/16/25 Room / Location:  OR  / SURGERY [facility]    Anesthesia Start: 0657 Anesthesia Stop: 0924    Procedure: ROBOTIC TOTAL LEFT KNEE ARTHROPLASTY (Left: Knee) Diagnosis: (LEFT KNEE OSTEOARTHRITIS)    Surgeons: Agustin Garza D.O. Responsible Provider: Jc Morgan M.D.    Anesthesia Type: spinal, peripheral nerve block ASA Status: 2            Final Anesthesia Type: spinal, peripheral nerve block  Last vitals  BP   Blood Pressure: (!) 147/81    Temp   36 °C (96.8 °F)    Pulse   70   Resp   16    SpO2   97 %      Anesthesia Post Evaluation    Patient location during evaluation: PACU  Patient participation: complete - patient participated  Level of consciousness: awake and alert  Pain score: 0    Airway patency: patent  Anesthetic complications: no  Cardiovascular status: hemodynamically stable  Respiratory status: acceptable  Hydration status: euvolemic    PONV: none          There were no known notable events for this encounter.     Nurse Pain Score: 0 (NPRS)

## 2025-07-16 NOTE — ANESTHESIA TIME REPORT
Anesthesia Start and Stop Event Times       Date Time Event    7/16/2025 0645 Ready for Procedure     0657 Anesthesia Start     0924 Anesthesia Stop          Responsible Staff  07/16/25      Name Role Begin End    Jc Morgan M.D. Anesth 0657 0924          Overtime Reason:  no overtime (within assigned shift)    Comments:

## 2025-07-16 NOTE — DISCHARGE INSTR - OTHER INFO
Discharge Instructions:     Diet: Resume your regular diet. Try to eat protein with every meal, this is important for healing and recovery. Drink plenty of water to stay hydrated and avoid constipation after surgery.    Restrictions: Weightbearing as tolerated on the operative extremity. Walk short distances at least three times daily. Work on bending and straightening the knee. See exercises.     Elevate/Ice: Elevate your knee above heart level to help with pain and swelling. Do this by placing pillows under the calf and ankle. Do not put anything directly under the knee. Ice for 20 minutes per hour while awake or after exercises for the first few days this will help with pain and swelling.     Pain control:   - Take tylenol 1000 mg three times daily for baseline pain control or for mild pain. Do not take more then two doses daily if you have any liver disease or impairment.    - Oxycodone is a narcotic pain medicine. You may take 5 mg every 4 hours as needed for severe pain. Do not drive or operate machinery when taking this medicine. This can cause constipation so take a stool softener when taking this. Start to wean by spacing out the time interval between doses and lowering the dose as able.    -Anti-inflammatories: You will be prescribed an anti-inflammatory to help with pain and inflammation. Meloxicam - take once daily. Always take with food and water. If you get an upset stomach or stomach pain stop taking this. Do not take other anti-inflammatories or NSAIDs such as ibuprofen, Advil, motrin while taking this.     -Methocarbamol is a muscle relaxer. You can take 1000 mg three times daily to help with pain and muscle spasms. It can make you dizzy or lightheaded so stop taking if this occurs.     Blood Clot Prevention: Take Aspirin 81 mg twice daily for 4 weeks after surgery.     Wound care: Keep wound clean and dry. The ACE wrap can be removed at any time. This is to help with swelling. You may shower over  the wound/silver dressing starting post operative day 1. The silver dressing is water proof. If water does get under remove it and replace dressings to keep wound covered unless you are in a clean environment then the wound can remain open to air. Do not let pets sit on or lick your wound. Call if you develop redness spreading from the wound, puss draining from the wound or new drainage from the wound after it has been dry.     Call if you develop fevers, chills, wound concerns or have any questions.     Future Appointments         Provider Department Center    7/24/2025 1:00 PM Agustin Garza D.O. [facility]

## 2025-07-16 NOTE — PROGRESS NOTES
4 Eyes Skin Assessment Completed by ***, RN and ***, RN.    Skin assessment is primarily focused on high risk bony prominences. Pay special attention to skin beneath and around medical devices, high risk bony prominences, skin to skin areas and areas where the patient lacks sensation to feel pain and areas where the patient previously had breakdown.     Head (Occipital):  {Eyes:95423061}   Ears (Under Medical Devices): {Ears:20912520}   Nose (Under Medical Devices): {Nose:24912068}   Mouth:  {Mouth:23072341}   Neck: {Neck:56735713}   Breast/Chest:  {Breast/Chest:06634471}   Shoulder Blades:  {Shoulder Blades:16597838}   Spine:   {Spine:92318222}   (R) Arm/Elbow/Hand: {Arm/Elbow/Hand:22747637}   (L) Arm/Elbow/Hand: {Arm/Elbow/Hand:25531120}   Abdomen: {Abdomen:30975122}   Pannus/Groin:  {Pannus/Groin:09674420}   Sacrum/Coccyx:   {Sacrum/Coccyx:13627000}   (R) Ischial Tuberosity (Sit Bones):  {Sit Bones:18975347}   (L) Ischial Tuberosity (Sit Bones):  {Sit Bones:81163009}   (R) Leg:  {Le}   (L) Leg:  {Le}   (R) Heel:  {Heel:66621153}   (R) Foot/Toe: {Foot/Toe:61602722}   (L) Heel: {Heel:60475298}   (L) Foot/Toe:  {Foot/Toe:23003368}       DEVICES IN USE:   Respiratory Devices:  {Respiratory Devices:15173385}  Feeding Devices:  {Feeding Devices:80739074}   Lines & BP Monitoring Devices:  {Lines and BP Monitoring Devices:48377636}    Orthopedic Devices:  {Orthopedic Devices:67180599}  Miscellaneous Devices:  {Miscellaneous Devices:53041012}    PROTOCOL INTERVENTIONS:   {Wound Care Interventions:34103588}    WOUND PHOTOS:   {Wound Photo:21421579}    WOUND CONSULT:   {Wound Consult:02214201}

## 2025-07-16 NOTE — PROGRESS NOTES
1455 Performed assessment  Pt is A&Ox4, complains of 2/10 pain; declines medicinal intervention, Updated on POC: mobilize, manage pain, wean O2, work with PT/OT, D/C home  Call light within reach, hourly rounding in place, bed in lowest position, bed alarm on.    1724: Called Mauri () to let him know pt will be d/c today, advised to meet us at ED entrance. Mauri verbalized understanding.

## 2025-07-16 NOTE — DISCHARGE INSTRUCTIONS
Prescription given.  Last pain medication given at NONE IN RECOVERY.    ACTIVITY: Rest and take it easy for the first 24 hours.  A responsible adult is recommended to remain with you during that time.  It is normal to feel sleepy.  We encourage you to not do anything that requires balance, judgment or coordination.    MILD FLU-LIKE SYMPTOMS ARE NORMAL. YOU MAY EXPERIENCE GENERALIZED MUSCLE ACHES, THROAT IRRITATION, HEADACHE AND/OR SOME NAUSEA.    FOR 24 HOURS DO NOT:  Drive, operate machinery or run household appliances.  Drink beer or alcoholic beverages.   Make important decisions or sign legal documents.         TOTAL KNEE REPLACEMENT, AFTER-CARE GUIDELINES     These instructions provide you with information on caring for yourself and your knee after surgery. Your health care provider may also give you instructions that are more specific. Your treatment was planned and performed according to current medical practices but problems sometimes occur. Call your health care provider if you have any problems or questions.     WHAT TO EXPECT AFTER THE PROCEDURE   After your procedure, your knee will typically be stiff, sore, and bruised. This will improve over time.     Pain   Follow your home pain management plan as discussed with your nurse and as directed by your provider.   It is important to follow any scheduled pain medications for maximal pain relief.   If prescribed opioid medication, the goal is to use opioids only as needed and to wean off prescription pain medicine as soon as possible.   Ice can be used for pain control.   Put ice in a plastic bag.   Place a towel between your skin and the bag.   Leave the ice on for 20 minutes, 2-3 times a day at a minimum.   Most patients are off the pain pills by 3 weeks. If your pain continues to be severe, follow up with your provider.     Infection   Knee joint infections occur in fewer than 2% of patients. The most common causes of infection following total knee  replacement surgery are from bacteria that enter the bloodstream during dental procedures, urinary tract infections, or skin infections. These bacteria can lodge around your knee replacement and cause an infection.   Keep the incision as clean and dry as possible.   Always wash your hands before touching your incision.   Avoid dental care for 3 months after surgery. Your provider may recommend taking a dose of antibiotics an hour prior to any dental procedure. After 2 years, most providers recommend antibiotics only before an extensive procedure. Ask your provider what they recommend.   Signs and symptoms of infection include low-grade fever, redness, pain, swelling and drainage from your incision. Notify your provider IMMEDIATELY if you develop ANY of these symptoms.     Post op Disturbances   Bowel Habits - Constipation is extremely common and caused by a combination of anesthesia, lack of mobility, dehydration and pain medicine. Use stool softeners or laxatives if necessary. It is important not to ignore this problem as bowel obstructions can be a serious complication after joint replacement surgery.   Mood/Energy Level - Many patients experience a lack of energy and endurance for up to 2-3 months after surgery. Some people feel down and can even become depressed. This is likely due to postoperative anemia, change in activity level, lack of sleep, pain medicine and just the emotional reaction to the surgery itself that is a big disruption in a person’s life. This usually passes. If symptoms persist, follow up with your primary care provider.  Returning to Work - Your provider will give you specific instructions based on your profession. Generally, if you work a sedentary job requiring little standing or walking, most patients may return within 2-6 weeks. Manual labor jobs involving walking, lifting and standing may take 3-4 months. Your provider’s office can provide a release to part-time or light duty work early  on in your recovery and progress you to full duty as able.   Driving - You can begin driving once cleared by your provider, provided you are no longer taking narcotic pain medication or any other medications that impair driving. Discuss the length of time expected with your provider as returning to driving depends on things such as your vehicle, which knee was replaced (right or left), and knee motion, strength and reflexes returning appropriately.   Avoiding falls - A fall during the first few weeks after surgery can damage your new knee and may result in a need for further surgery.  throw rugs and tack down loose carpeting. Be aware of floor hazards such as pets, small objects or uneven surfaces. Notify your provider of any falls.   Airport Metal Detectors - The sensitivity of metal detectors varies and it is likely that your prosthesis will cause an alarm. Inform the  of your artificial joint.     Diet   Resume your normal diet as tolerated.   It is important to achieve a healthy nutritional status by eating a well-balanced diet on a regular basis.   Your provider may recommend that you take iron and vitamin supplements.   Continue to drink plenty of fluids.     Shower/Bathing   You may shower as soon as you get home from the hospital unless otherwise instructed.   Keep your incision out of water to prevent infection. To keep the incision dry when showering, cover it with a plastic bag or plastic wrap. If your bandage is waterproof, this may not be necessary. o Pat incision dry if it gets wet. Do not rub. Notify your provider.   Do not submerge in a bath until cleared by your provider. Your staples must be out and the incision completely healed.     Dressing Change: Only change your dressing if directed by your provider.   Wash hands.   Open all dressing change materials.   Remove old dressing and discard.   Inspect incision for signs of irritation or infection including redness, increase  in clear drainage, yellow/green drainage, odor and surrounding skin hot to touch. Notify your provider if present.    the new dressing by one corner and lay over the incision. Be careful not to touch the inside of the dressing that will lay over the incision.   Secure in place as instructed. Swelling/Bruising   Swelling is normal after knee replacement and can involve the thigh, knee, calf and foot.   Swelling can last from 3-6 months.   To reduce swelling, elevate your leg higher than your heart while reclining. The first week you are home you should elevate your leg an equal amount of time as you are active.   The swelling is usually worse after you go home since you are upright for longer periods of time.   Bruising often does not appear until after you arrive home and can be quite dramatic- appearing purple, black, or green. Bruising is typically not concerning and will subside without any treatment.     Blood Clot Prevention   Your treatment plan includes multiple preventative measures to decrease the risk of blood clots in the legs (DVTs) and the less common, but serious, clots that travel to the lungs (pulmonary emboli). Most patients are at standard risk for them, but people who are at higher risk include those who have had previous clots, a family history of clotting, smoking, diabetes, obesity, advanced age, use estrogen and/or live a sedentary lifestyle.     Signs of blood clots in legs include - Swelling in thigh, calf or ankle that does not go down with elevation. Pain, heat and tenderness in calf, back of calf or groin area. NOTE: blood clots can occur in either leg.   Signs of blood clots in lungs include - Sudden increased shortness of breath, sudden onset of chest pain, and localized chest pain with coughing.   If you experience any of the above symptoms, notify your provider and seek medical attention immediately.   You received anticoagulant therapy (blood thinners) in the hospital.  Continue the prescribed blood-thinning medication at home, as directed by your provider.   Your risk for developing a clot continues for up to 2-3 months after surgery. Avoid prolonged sitting and dehydration (long air trips and car trips). If you do take a trip during this time, please get up, move around every 1-1.5 hours, and discuss all travel plans with your provider.     Activity   Once home, stay active. The key is not to overdo it. While you can expect some good days and some bad days, you should notice a gradual improvement and a gradual increase in your endurance over the next 6 to 12 months. Exercise is a critical component of recovery, particularly during the first few weeks after surgery.     Normal activities of daily living - Expect to resume most within 3 to 6 weeks following surgery. Some pain with activity and at night is common for several weeks after surgery. Walk as much as you like once your doctor gives permission to proceed, but remember that walking is no substitute for the exercises your doctor and physical therapist prescribe. Use a walker, crutches or cane to assist with walking until you can walk smoothly (minimal or no limp) without assistance.   Physical Therapy Exercises - Follow your home exercise program as instructed by your physical therapist during your hospital stay. Call and set up outpatient physical therapy appointments per your provider’s recommendations. Physical therapy after the hospital stay focuses on increasing your range of motion, strengthening your muscles and improving your gait/walking pattern. Contact your provider for the referral to outpatient physical therapy if you have not yet received this. ?   Riding a stationary bicycle can help maintain muscle tone and keep your knee flexible. Begin stationary bicycling as directed by your physical therapist or provider.   Sexual Activity - Your provider can tell you when it safe to resume sexual activity.   Sleeping  Positions - You can safely sleep on your back, on either side, or on your stomach.   Other Activities - Lower impact activities are preferred. Consult your provider if you have specific questions.     Infection statistic resource:   https://[email or web address]/contents/prosthetic-joint-infection-epidemiology-microbiology-clinical-manifestations-and-diagnosis     DIET: To avoid nausea, slowly advance diet as tolerated, avoiding spicy or greasy foods for the first day.  Add more substantial food to your diet according to your physician's instructions.  Babies can be fed formula or breast milk as soon as they are hungry.  INCREASE FLUIDS AND FIBER TO AVOID CONSTIPATION.    FOLLOW-UP APPOINTMENT:  A follow-up appointment should be arranged with your doctor; call to schedule.    You should CALL YOUR PHYSICIAN if you develop:  Fever greater than 101 degrees F.  Pain not relieved by medication, or persistent nausea or vomiting.  Excessive bleeding (blood soaking through dressing) or unexpected drainage from the wound.  Extreme redness or swelling around the incision site, drainage of pus or foul smelling drainage.  Inability to urinate or empty your bladder within 8 hours.  Problems with breathing or chest pain.    You should call 911 if you develop problems with breathing or chest pain.  If you are unable to contact your doctor or surgical center, you should go to the nearest emergency room or urgent care center.  Physician's telephone #: [phone]    If any questions arise, call your doctor.  If your doctor is not available, please feel free to call the Surgical Center at [phone].     A registered nurse may call you a few days after your surgery to see how you are doing after your procedure.    MEDICATIONS: Resume taking daily medication.  Take prescribed pain medication with food.  If no medication is prescribed, you may take non-aspirin pain medication if needed.  PAIN MEDICATION CAN BE VERY CONSTIPATING.  Take  a stool softener or laxative such as senokot, pericolace, or milk of magnesia if needed.    If your physician has prescribed pain medication that includes Acetaminophen (Tylenol), do not take additional Acetaminophen (Tylenol) while taking the prescribed medication.

## 2025-07-16 NOTE — ANESTHESIA PROCEDURE NOTES
Spinal Block    Date/Time: 7/16/2025 7:04 AM    Performed by: Jc Morgan M.D.  Authorized by: Jc Morgan M.D.    Patient Location:  OR  Start Time:  7/16/2025 7:04 AM  End Time:  7/16/2025 7:07 AM  Reason for Block: primary anesthetic    patient identified, IV checked, site marked, risks and benefits discussed, surgical consent, monitors and equipment checked, pre-op evaluation and timeout performed    Patient Position:  Sitting  Prep: ChloraPrep, patient draped and sterile technique    Monitoring:  Blood pressure, continuous pulse oximetry and heart rate  Approach:  Midline  Location:  L3-4  Injection Technique:  Single-shot  Skin infiltration:  Lidocaine  Strength:  1%  Dose:  3ml  Needle Type:  Pencan  Needle Gauge:  25 G  CSF flowing pre/post injection:  Yes  Sensory Level:  T6

## 2025-07-17 ENCOUNTER — TELEPHONE (OUTPATIENT)
Facility: MEDICAL CENTER | Age: 60
End: 2025-07-17
Payer: MEDICAID

## 2025-07-17 DIAGNOSIS — Z96.652 S/P TKR (TOTAL KNEE REPLACEMENT), LEFT: Primary | ICD-10-CM

## 2025-07-17 NOTE — TELEPHONE ENCOUNTER
Called patient to check how she is feeling post op TKA. Patient stated that she is having moderate pain. Regarding her medication regimen she is currently only taking her oxycodone 5mg. Reiterated that she is to also to take the meloxicam, methocarbomal and OTC tylenol to better manage pain, with the oxycodone on hand to help with breakthrough pain as well as ice and elevation to help with swelling. Also reminded to patient to start on the aspirin 81mg as soon as possible for DVT prophylaxis. Patient to call with any further questions or concerns.    -Mauri CROWDER

## 2025-07-24 ENCOUNTER — OFFICE VISIT (OUTPATIENT)
Facility: MEDICAL CENTER | Age: 60
End: 2025-07-24
Payer: MEDICAID

## 2025-07-24 ENCOUNTER — HOSPITAL ENCOUNTER (OUTPATIENT)
Dept: RADIOLOGY | Facility: MEDICAL CENTER | Age: 60
End: 2025-07-24
Attending: ORTHOPAEDIC SURGERY
Payer: MEDICAID

## 2025-07-24 VITALS
HEART RATE: 96 BPM | TEMPERATURE: 98 F | WEIGHT: 173.06 LBS | SYSTOLIC BLOOD PRESSURE: 138 MMHG | BODY MASS INDEX: 26.23 KG/M2 | DIASTOLIC BLOOD PRESSURE: 84 MMHG | OXYGEN SATURATION: 98 % | HEIGHT: 68 IN

## 2025-07-24 DIAGNOSIS — Z96.652 S/P TKR (TOTAL KNEE REPLACEMENT), LEFT: Primary | ICD-10-CM

## 2025-07-24 PROCEDURE — 3075F SYST BP GE 130 - 139MM HG: CPT | Performed by: ORTHOPAEDIC SURGERY

## 2025-07-24 PROCEDURE — 73562 X-RAY EXAM OF KNEE 3: CPT | Mod: LT

## 2025-07-24 PROCEDURE — 3079F DIAST BP 80-89 MM HG: CPT | Performed by: ORTHOPAEDIC SURGERY

## 2025-07-24 PROCEDURE — 99024 POSTOP FOLLOW-UP VISIT: CPT | Performed by: ORTHOPAEDIC SURGERY

## 2025-07-24 ASSESSMENT — ENCOUNTER SYMPTOMS
CHILLS: 0
TINGLING: 0
SHORTNESS OF BREATH: 0
FEVER: 0
SENSORY CHANGE: 0

## 2025-07-24 NOTE — PROGRESS NOTES
[facility]          Surgical Interventions:   Hardware removal left tibia 2. Left robotic total knee 25    HPI: Patient is a pleasant 60 yof here for post op follow up after the above. She reports she is doing well. Pain has been controlled with the oxycodone, tylenol and meloxicam. She reports she is only taking aspirin once a day. She denies CP, SOB, numbness or paresthesias.     Past Medical History:   Past Medical History[1]    Past Surgical History:  Past Surgical History[2]    Encounter Medications[3]     Allergies:   Allergies[4]    Family History:   No family history on file.    Social History:   Tobacco Use History[5]  Social History     Substance and Sexual Activity   Alcohol Use Yes    Comment: 4-5 beers per week     Social History     Substance and Sexual Activity   Drug Use Yes    Types: Inhaled    Comment: marijuana     Social History     Socioeconomic History    Marital status:      Spouse name: Not on file    Number of children: Not on file    Years of education: Not on file    Highest education level: 12th grade   Occupational History    Not on file   Tobacco Use    Smoking status: Former     Current packs/day: 0.00     Average packs/day: 0.5 packs/day for 41.9 years (21.0 ttl pk-yrs)     Types: Cigarettes     Start date: 1983     Quit date: 2024     Years since quittin.6    Smokeless tobacco: Never   Vaping Use    Vaping status: Never Used   Substance and Sexual Activity    Alcohol use: Yes     Comment: 4-5 beers per week    Drug use: Yes     Types: Inhaled     Comment: marijuana    Sexual activity: Not on file   Other Topics Concern    Not on file   Social History Narrative    Not on file     Social Drivers of Health     Financial Resource Strain: Low Risk  (7/10/2025)    Overall Financial Resource Strain (CARDIA)     Difficulty of Paying Living Expenses: Not very hard   Food Insecurity: No Food Insecurity (7/10/2025)    Hunger Vital Sign     Worried About  "Running Out of Food in the Last Year: Never true     Ran Out of Food in the Last Year: Never true   Transportation Needs: No Transportation Needs (7/10/2025)    PRAPARE - Transportation     Lack of Transportation (Medical): No     Lack of Transportation (Non-Medical): No   Physical Activity: Inactive (7/10/2025)    Exercise Vital Sign     Days of Exercise per Week: 0 days     Minutes of Exercise per Session: 0 min   Stress: No Stress Concern Present (7/10/2025)    [employer] [place] [employer] - Occupational Stress Questionnaire     Feeling of Stress : Only a little   Social Connections: Socially Isolated (7/10/2025)    Social Connection and Isolation Panel [NHANES]     Frequency of Communication with Friends and Family: Once a week     Frequency of Social Gatherings with Friends and Family: Never     Attends L[employer] day Services: Never     Active Member of Clubs or Organizations: No     Attends Club or Organization Meetings: Never     Marital Status:    Intimate Partner Violence: Not on file   Housing Stability: Low Risk  (7/10/2025)    Housing Stability Vital Sign     Unable to Pay for Housing in the Last Year: No     Number of Times Moved in the Last Year: 0     Homeless in the Last Year: No       Review of Systems:  Review of Systems   Constitutional:  Negative for chills and fever.   Respiratory:  Negative for shortness of breath.    Cardiovascular:  Negative for chest pain.   Musculoskeletal:  Positive for joint pain.   Neurological:  Negative for tingling and sensory change.       Physical Exam:  /84 (BP Location: Left arm, Patient Position: Sitting, BP Cuff Size: Adult)   Pulse 96   Temp 36.7 °C (98 °F) (Temporal)   Ht 1.727 m (5' 8\")   Wt 78.5 kg (173 lb 1 oz)   LMP 11/20/2016   SpO2 98%   BMI 26.31 kg/m²     Physical Exam  Constitutional:       General: She is not in acute distress.     Appearance: Normal appearance. She is not ill-appearing.   HENT:      Head: Normocephalic " and atraumatic.   Pulmonary:      Effort: Pulmonary effort is normal. No respiratory distress.   Musculoskeletal:      Cervical back: Normal range of motion and neck supple.   Skin:     General: Skin is warm and dry.      Capillary Refill: Capillary refill takes less than 2 seconds.   Neurological:      General: No focal deficit present.      Mental Status: She is alert and oriented to person, place, and time.   Psychiatric:         Mood and Affect: Mood normal.         Behavior: Behavior normal.         left knee: Overlying skin demonstrates a healing wound. Knee ROM is ~20-90.  SILT spn, dpn, plantar and sural nerves. Motor intact to knee extension, ankle dorsiflexion, ankle plantar flexion, and eversion. DP/PT pulse 1+. There is moderate swelling to the knee.    Imaging:   Multiple views of the left knee demonstrates interval hardware removal of the left tibia and total knee arthroplasty. Components appear intact and without evidence of complication.      Assessment and Plan:  S/p left tibia hardware removal and total knee arthroplasty 25    -Doris is doing well post op. She has therapy scheduled to start next week. We discussed the necessity to take aspirin twice daily for DVT prophylaxis. We also reviewed home exercises, icing and elevation. She will follow up in 4 weeks.     All questions were answered and they were in agreement with the plan.     Referrals: none  Restrictions: none  New medications/refills: none  Imaging studies: none  Follow-up: 4 weeks    Agustin Garza DO  Orthopedics and Orthopedic Oncology          [1]   Past Medical History:  Diagnosis Date    Compression fracture of L1 lumbar vertebra (HCC)     Compression fracture of L2 lumbar vertebra (HCC)     Cyst of ovary, left     Dental disorder     full upper and lower dentures    GERD (gastroesophageal reflux disease)     H/O:      Heart burn     daily, take Prilosec as needed    Heart murmur     as a child, pt presents no  longer present    History of appendectomy     Hypertension     Pain     left knee and lower back    Rheumatic fever     Urinary incontinence    [2]   Past Surgical History:  Procedure Laterality Date    ARTHROPLASTY, KNEE, ROBOT-ASSISTED Left 2025    Procedure: ROBOTIC TOTAL LEFT KNEE ARTHROPLASTY;  Surgeon: Agustin Garza D.O.;  Location: SURGERY [facility];  Service: Ortho Robotic    ORIF, WRIST Left 2017    Procedure: WRIST ORIF;  Surgeon: Cortez Mccall M.D.;  Location: SURGERY [facility];  Service:     PRIMARY C SECTION      APPENDECTOMY      GYN SURGERY      for ruptured left ovarian cyst   [3]   Outpatient Encounter Medications as of 2025   Medication Sig Dispense Refill    meloxicam (MOBIC) 15 MG tablet Take 1 Tablet by mouth every day. 30 Tablet 0    aspirin 81 MG EC tablet Take 1 Tablet by mouth 2 times a day. 60 Tablet 0    losartan (COZAAR) 25 MG Tab Take 1 tablet by mouth once daily 30 Tablet 0    omeprazole (PRILOSEC) 20 MG delayed-release capsule Take 20 mg by mouth as needed.      omeprazole (PRILOSEC) 40 MG delayed-release capsule Take 1 capsule by mouth once daily (Patient not taking: Reported on 2025) 30 Capsule 0     No facility-administered encounter medications on file as of 2025.   [4] No Known Allergies  [5]   Social History  Tobacco Use   Smoking Status Former    Current packs/day: 0.00    Average packs/day: 0.5 packs/day for 41.9 years (21.0 ttl pk-yrs)    Types: Cigarettes    Start date: 1983    Quit date: 2024    Years since quittin.6   Smokeless Tobacco Never

## 2025-07-30 ENCOUNTER — PHYSICAL THERAPY (OUTPATIENT)
Dept: PHYSICAL THERAPY | Facility: REHABILITATION | Age: 60
End: 2025-07-30
Attending: ORTHOPAEDIC SURGERY
Payer: MEDICAID

## 2025-07-30 DIAGNOSIS — Z96.652 S/P TKR (TOTAL KNEE REPLACEMENT), LEFT: ICD-10-CM

## 2025-07-30 DIAGNOSIS — M17.32 POST-TRAUMATIC OSTEOARTHRITIS OF LEFT KNEE: Primary | ICD-10-CM

## 2025-07-30 PROCEDURE — 97161 PT EVAL LOW COMPLEX 20 MIN: CPT

## 2025-07-30 PROCEDURE — 97110 THERAPEUTIC EXERCISES: CPT

## 2025-07-30 ASSESSMENT — ENCOUNTER SYMPTOMS
PAIN SCALE AT HIGHEST: 6
PAIN SCALE: 4
PAIN SCALE AT LOWEST: 4

## 2025-07-30 NOTE — OP THERAPY EVALUATION
Outpatient Physical Therapy  INITIAL EVALUATION    [facility] [place]  [address]  [place] [address]  Phone:  [phone]  Fax:  [phone]    Date of Evaluation: 2025    Patient: Doris Tate  YOB: 1965  MRN: [id number]     Referring Provider: ROMA Marroquin [address] [place] [address]   Referring Diagnosis Post-traumatic osteoarthritis of left knee [M17.32];S/P TKR (total knee replacement), left [Z96.652]     Time Calculation  Start time: 1330  Stop time: 1415 Time Calculation (min): 45 minutes     Chief Complaint: Left knee  Visit Diagnoses     ICD-10-CM   1. Post-traumatic osteoarthritis of left knee  M17.32   2. S/P TKR (total knee replacement), left  Z96.652       Date of onset of impairment: 2025    Subjective:   History of Present Illness:     Date of surgery:  2024    Mechanism of injury:  Doris Tate is a 60 y.o. female that presents to therapy s/p left TKA on 24.  She had bee dealing with chronic knee pain for a few years previously. Her pain I currently located all about the left knee. She notes some numbness along her anterior shin. She has 4 steps into her house and has been using her 2WW for ambulation at all times.      Aggravating factors: bending, sitting still/ stiffness when getting up, stairs, getting in and out of the car.  Walking > 5min.   Relieving factors: ice, pain medication    ADL limitations:limited with the above post operatively   Pain:     Current pain ratin    At best pain ratin    At worst pain ratin      Past Medical History[1]  Past Surgical History[2]  Social History     Tobacco Use    Smoking status: Former     Current packs/day: 0.00     Average packs/day: 0.5 packs/day for 41.9 years (21.0 ttl pk-yrs)     Types: Cigarettes     Start date: 1983     Quit date: 2024     Years since quittin.6    Smokeless tobacco: Never   Substance Use  Topics    Alcohol use: Yes     Comment: 4-5 beers per week     Social Hx - Family and Occupational[3]    Objective     Passive Range of Motion   Left Knee   Flexion: 115 degrees with pain  Extension: -10 degrees     Right Knee   Flexion: 135 degrees   Extension: 0 degrees     Patellar Static Positioning   Left Knee: WFL  Right Knee: WFL    Strength:      Left Knee   Flexion: 4+  Extension: 4  Quadriceps contraction: fair    Tests     Additional Tests Details  -homans    Swelling     Left Knee Girth Measurement (cm)   Joint line: 39.8 cm  10 cm above joint line: 40.8 cm  10 cm below joint line: 37.5 cm        Therapeutic Exercises (CPT 65122):       Therapeutic Exercise Summary: HEP instruction/performance and development. Handout provided and exercises located below:  Access Code: [email or web address]  URL: [email or web address]  Date: 07/30/2025  Prepared by: Deonte Marr    Exercises  - Supine Quad Set  - 3 x daily - 10 reps - 5 hold  - Supine Knee Extension Stretch on Towel Roll  - 4 x daily - 1-3 reps -  hold  - Seated Knee Flexion Stretch  - 4 x daily - 3 reps - 20 hold  - Seated Long Arc Quad  - 3 x daily - 2 sets - 10 reps  - Seated Ankle Pumps  - 4 x daily - 20 reps      Time-based treatments/modalities:    Physical Therapy Timed Treatment Charges  Therapeutic exercise minutes (CPT 60136): 10 minutes      Assessment, Response and Plan:   Assessment details:  Doris Tate is a 60 y.o. female with signs and symptoms consistent with post operative status of left total knee arthroplasty. She requires skilled physical therapy intervention to decrease pain, increase range of motion, increase functional mobility, improve ADL completion and establish a home exercise program.  Goals:   Short Term Goals:   1. Patient will be Independent with prescribed Home Exercise Program (HEP) and will be able to demonstrate exercises without cues for improved overall symptoms/activity tolerance.   2. Pt will improve knee  extension ROM to be less than 5 degrees from neutral for improved terminal knee extension in standing and overall improved gait.  Short term goal time span:  2-4 weeks      Long Term Goals:    3. Pt will demonstrate ability to go up and down steps without AD and with observed control/ no evidence of imbalance.   4. Pt will improve LEFS score to greater than 60/80 indicative of improved function and reduced perceived disability.  PT long term goal timespan: 10-12 weeks.    Plan:   Planned therapy interventions:  Therapeutic Exercise (CPT 39947) and E Stim Unattended (CPT 78855)  Other planned therapy interventions:  2x/week for 12 weeks  97110 x3, 97014 x1  Frequency: 12 weeks.  Discussed with:  Patient    Functional Assessment Used  PT Functional Assessment Tool Used: LEFS  PT Functional Assessment Score: 36/80     Referring provider co-signature:  I have reviewed this plan of care and my co-signature certifies the need for services.    Certification Period: 2025 to  10/16/25    Physician Signature: ________________________________ Date: ______________                      [1]   Past Medical History:  Diagnosis Date    Compression fracture of L1 lumbar vertebra (HCC)     Compression fracture of L2 lumbar vertebra (HCC)     Cyst of ovary, left     Dental disorder     full upper and lower dentures    GERD (gastroesophageal reflux disease)     H/O:      Heart burn     daily, take Prilosec as needed    Heart murmur     as a child, pt presents no longer present    History of appendectomy     Hypertension     Pain     left knee and lower back    Rheumatic fever 1975    Urinary incontinence    [2]   Past Surgical History:  Procedure Laterality Date    ARTHROPLASTY, KNEE, ROBOT-ASSISTED Left 2025    Procedure: ROBOTIC TOTAL LEFT KNEE ARTHROPLASTY;  Surgeon: Agustin Garza D.O.;  Location: SURGERY [facility];  Service: Ortho Robotic    ORIF, WRIST Left 2017    Procedure: WRIST ORIF;  Surgeon:  Cortez Mccall M.D.;  Location: SURGERY [facility];  Service:     PRIMARY C SECTION  1987    APPENDECTOMY  1981    GYN SURGERY  1981    for ruptured left ovarian cyst   [3]   Family and Occupational History  Socioeconomic History    Marital status:      Spouse name: Not on file    Number of children: Not on file    Years of education: Not on file    Highest education level: 12th grade   Occupational History    Not on file

## 2025-08-04 ENCOUNTER — PHYSICAL THERAPY (OUTPATIENT)
Dept: PHYSICAL THERAPY | Facility: REHABILITATION | Age: 60
End: 2025-08-04
Attending: ORTHOPAEDIC SURGERY
Payer: MEDICAID

## 2025-08-04 DIAGNOSIS — M17.32 POST-TRAUMATIC OSTEOARTHRITIS OF LEFT KNEE: Primary | ICD-10-CM

## 2025-08-04 DIAGNOSIS — Z96.652 S/P TKR (TOTAL KNEE REPLACEMENT), LEFT: ICD-10-CM

## 2025-08-04 PROCEDURE — 97110 THERAPEUTIC EXERCISES: CPT

## 2025-08-04 RX ORDER — LOSARTAN POTASSIUM 25 MG/1
25 TABLET ORAL DAILY
Qty: 30 TABLET | Refills: 0 | Status: SHIPPED | OUTPATIENT
Start: 2025-08-04

## 2025-08-11 ENCOUNTER — PHYSICAL THERAPY (OUTPATIENT)
Dept: PHYSICAL THERAPY | Facility: REHABILITATION | Age: 60
End: 2025-08-11
Attending: ORTHOPAEDIC SURGERY
Payer: MEDICAID

## 2025-08-11 DIAGNOSIS — M17.32 POST-TRAUMATIC OSTEOARTHRITIS OF LEFT KNEE: Primary | ICD-10-CM

## 2025-08-11 DIAGNOSIS — Z96.652 S/P TKR (TOTAL KNEE REPLACEMENT), LEFT: ICD-10-CM

## 2025-08-11 PROCEDURE — 97110 THERAPEUTIC EXERCISES: CPT

## 2025-08-19 ENCOUNTER — PHYSICAL THERAPY (OUTPATIENT)
Dept: PHYSICAL THERAPY | Facility: REHABILITATION | Age: 60
End: 2025-08-19
Attending: ORTHOPAEDIC SURGERY
Payer: MEDICAID

## 2025-08-19 DIAGNOSIS — M17.32 POST-TRAUMATIC OSTEOARTHRITIS OF LEFT KNEE: Primary | ICD-10-CM

## 2025-08-19 DIAGNOSIS — Z96.652 S/P TKR (TOTAL KNEE REPLACEMENT), LEFT: ICD-10-CM

## 2025-08-19 PROCEDURE — 97110 THERAPEUTIC EXERCISES: CPT

## 2025-08-19 PROCEDURE — 97014 ELECTRIC STIMULATION THERAPY: CPT

## 2025-08-21 ENCOUNTER — PHYSICAL THERAPY (OUTPATIENT)
Dept: PHYSICAL THERAPY | Facility: REHABILITATION | Age: 60
End: 2025-08-21
Attending: ORTHOPAEDIC SURGERY
Payer: MEDICAID

## 2025-08-21 DIAGNOSIS — Z96.652 S/P TKR (TOTAL KNEE REPLACEMENT), LEFT: ICD-10-CM

## 2025-08-21 DIAGNOSIS — M17.32 POST-TRAUMATIC OSTEOARTHRITIS OF LEFT KNEE: Primary | ICD-10-CM

## 2025-08-21 PROCEDURE — 97110 THERAPEUTIC EXERCISES: CPT

## 2025-08-21 PROCEDURE — 97014 ELECTRIC STIMULATION THERAPY: CPT

## 2025-08-25 ENCOUNTER — OFFICE VISIT (OUTPATIENT)
Facility: MEDICAL CENTER | Age: 60
End: 2025-08-25
Payer: MEDICAID

## 2025-08-25 ENCOUNTER — PHYSICAL THERAPY (OUTPATIENT)
Dept: PHYSICAL THERAPY | Facility: REHABILITATION | Age: 60
End: 2025-08-25
Attending: ORTHOPAEDIC SURGERY
Payer: MEDICAID

## 2025-08-25 VITALS
DIASTOLIC BLOOD PRESSURE: 60 MMHG | WEIGHT: 165.34 LBS | HEART RATE: 89 BPM | SYSTOLIC BLOOD PRESSURE: 102 MMHG | HEIGHT: 68 IN | BODY MASS INDEX: 25.06 KG/M2 | OXYGEN SATURATION: 94 % | TEMPERATURE: 97.1 F

## 2025-08-25 DIAGNOSIS — Z96.652 S/P TKR (TOTAL KNEE REPLACEMENT), LEFT: Primary | ICD-10-CM

## 2025-08-25 DIAGNOSIS — M17.32 POST-TRAUMATIC OSTEOARTHRITIS OF LEFT KNEE: Primary | ICD-10-CM

## 2025-08-25 DIAGNOSIS — Z96.652 S/P TKR (TOTAL KNEE REPLACEMENT), LEFT: ICD-10-CM

## 2025-08-25 PROCEDURE — 99024 POSTOP FOLLOW-UP VISIT: CPT | Performed by: ORTHOPAEDIC SURGERY

## 2025-08-25 PROCEDURE — 97110 THERAPEUTIC EXERCISES: CPT

## 2025-08-25 PROCEDURE — 3078F DIAST BP <80 MM HG: CPT | Performed by: ORTHOPAEDIC SURGERY

## 2025-08-25 PROCEDURE — 3074F SYST BP LT 130 MM HG: CPT | Performed by: ORTHOPAEDIC SURGERY

## 2025-08-25 PROCEDURE — 97014 ELECTRIC STIMULATION THERAPY: CPT

## 2025-08-25 ASSESSMENT — ENCOUNTER SYMPTOMS
SENSORY CHANGE: 0
TINGLING: 0
SHORTNESS OF BREATH: 0

## 2025-08-28 ENCOUNTER — PHYSICAL THERAPY (OUTPATIENT)
Dept: PHYSICAL THERAPY | Facility: REHABILITATION | Age: 60
End: 2025-08-28
Attending: ORTHOPAEDIC SURGERY
Payer: MEDICAID

## 2025-08-28 DIAGNOSIS — Z96.652 S/P TKR (TOTAL KNEE REPLACEMENT), LEFT: ICD-10-CM

## 2025-08-28 DIAGNOSIS — M17.32 POST-TRAUMATIC OSTEOARTHRITIS OF LEFT KNEE: Primary | ICD-10-CM

## 2025-08-28 PROCEDURE — 97014 ELECTRIC STIMULATION THERAPY: CPT

## 2025-08-28 PROCEDURE — 97110 THERAPEUTIC EXERCISES: CPT

## (undated) DEVICE — MASK ANESTHESIA ADULT  - (100/CA)

## (undated) DEVICE — SET LEADWIRE 5 LEAD BEDSIDE DISPOSABLE ECG (1SET OF 5/EA)

## (undated) DEVICE — GLOVE BIOGEL SZ 7 SURGICAL PF LTX - (50PR/BX 4BX/CA)

## (undated) DEVICE — CLOSURE PRINEO SKIN - (2EA/BX)

## (undated) DEVICE — SET EXTENSION WITH 2 PORTS (48EA/CA) ***PART #2C8610 IS A SUBSTITUTE*****

## (undated) DEVICE — SODIUM CHL IRRIGATION 0.9% 1000ML (12EA/CA)

## (undated) DEVICE — DRESSING POST OP BORDER 4 X 10 (5EA/BX)

## (undated) DEVICE — CANISTER SUCTION RIGID RED 1500CC (40EA/CA)

## (undated) DEVICE — GLOVE BIOGEL SZ 7.5 SURGICAL PF LTX - (50PR/BX 4BX/CA)

## (undated) DEVICE — Device

## (undated) DEVICE — SUTURE 3-0 ETHILON FS-1 - (36/BX) 30 INCH

## (undated) DEVICE — SUTURE 2-0 VICRYL PLUS CT-2 - 27 INCH (36/BX)

## (undated) DEVICE — TIP INTPLS HFLO ML ORFC BTRY - (12/CS) FOR SURGILAV

## (undated) DEVICE — TUBE E-T HI-LO CUFF 6.5MM (10EA/BX)

## (undated) DEVICE — DRAPE IOBAN II 23 IN X 33 IN - (10/BX)

## (undated) DEVICE — SLEEVE, VASO, THIGH, MED

## (undated) DEVICE — MIXER BONE CEMENT REVOLUTION - W/FEMORAL PRESSURIZER (6/CA)

## (undated) DEVICE — STOCKINETTE IMPERVIOUS 12X48 - STERILELF (10/CA)"

## (undated) DEVICE — GLOVE BIOGEL SZ 6.5 SURGICAL PF LTX (50PR/BX 4BX/CA)

## (undated) DEVICE — BLADE SAGITTAL SAW DUAL CUT 25.0 X 90.0 X 1.27MM (1/EA)

## (undated) DEVICE — DRAPE STRLE REG TOWEL 18X24 - (10/BX 4BX/CA)"

## (undated) DEVICE — KIT ROOM DECONTAMINATION

## (undated) DEVICE — DRAPE C-ARM LARGE 41IN X 74 IN - (10/BX 2BX/CA)

## (undated) DEVICE — PROTECTOR ULNA NERVE - (36PR/CA)

## (undated) DEVICE — ELECTRODE 850 FOAM ADHESIVE - HYDROGEL RADIOTRNSPRNT (50/PK)

## (undated) DEVICE — HUMID-VENT HEAT AND MOISTURE EXCHANGE- (50/BX)

## (undated) DEVICE — NEPTUNE 4 PORT MANIFOLD - (20/PK)

## (undated) DEVICE — SUTURE GENERAL

## (undated) DEVICE — KIT DRAPE RIO ONE PIECE WITH POCKETS(10EA MINIMUM ORDER)  (1EA)

## (undated) DEVICE — PAD PREP 24 X 48 CUFFED - (100/CA)

## (undated) DEVICE — GLOVE BIOGEL SZ 8 SURGICAL PF LTX - (50PR/BX 4BX/CA)

## (undated) DEVICE — BANDAGE ELASTIC 4 HONEYCOMB - 4"X5YD LF (20/CA)"

## (undated) DEVICE — SUTURE 2-0 VICRYL PLUS CT-1 - 8 X 18 INCH(12/BX)

## (undated) DEVICE — GLOVE BIOGEL INDICATOR SZ 7.5 SURGICAL PF LTX - (50PR/BX 4BX/CA)

## (undated) DEVICE — SUCTION INSTRUMENT YANKAUER BULBOUS TIP W/O VENT (50EA/CA)

## (undated) DEVICE — BIT DRILL SHORT W/QC 2.7MM (1TX2+1TX2=4)

## (undated) DEVICE — FIBERWIRE 2.0 (12EA/BX)

## (undated) DEVICE — LACTATED RINGERS INJ 1000 ML - (14EA/CA 60CA/PF)

## (undated) DEVICE — HEAD HOLDER JUNIOR/ADULT

## (undated) DEVICE — ELECTRODE DUAL RETURN W/ CORD - (50/PK)

## (undated) DEVICE — KIT ANESTHESIA W/CIRCUIT & 3/LT BAG W/FILTER (20EA/CA)

## (undated) DEVICE — TUBING CLEARLINK DUO-VENT - C-FLO (48EA/CA)

## (undated) DEVICE — HANDPIECE 10FT INTPLS SCT PLS IRRIGATION HAND CONTROL SET (6/PK)

## (undated) DEVICE — PIN FIXATION BONE STERILE 3.2MM X 140MM (2EA/PK)

## (undated) DEVICE — GLOVE BIOGEL PI INDICATOR SZ 8.0 SURGICAL PF LF -(50/BX 4BX/CA)

## (undated) DEVICE — CANISTER SUCTION 3000ML MECHANICAL FILTER AUTO SHUTOFF MEDI-VAC NONSTERILE LF DISP  (40EA/CA)

## (undated) DEVICE — SENSOR SPO2 NEO LNCS ADHESIVE (20/BX) SEE USER NOTES

## (undated) DEVICE — SUTURE 0 VICRYL PLUS CT-2 - 27 INCH (36/BX)

## (undated) DEVICE — CHLORAPREP 26 ML APPLICATOR - ORANGE TINT(25/CA)

## (undated) DEVICE — SENSOR OXIMETER ADULT SPO2 RD SET (20EA/BX)

## (undated) DEVICE — SUTURE 3-0 30 CM X 30 CM STRATAFIX SPRIAL PS-1 NEEDLE (12/BX)

## (undated) DEVICE — SPLINT PLASTER 4 IN  X 15 IN - (50/BX 12BX/CA)

## (undated) DEVICE — PACK LOWER EXTREMITY - (2/CA)

## (undated) DEVICE — LEAD SET 6 DISP. EKG NIHON KOHDEN

## (undated) DEVICE — GLOVE BIOGEL PI INDICATOR SZ 7.0 SURGICAL PF LF - (50/BX 4BX/CA)

## (undated) DEVICE — DRAPE LARGE 3 QUARTER - (20/CA)

## (undated) DEVICE — SYSTEM NAVIGATION VIZADISC KNEE PROCEDURE TRACKING KIT (1EA)

## (undated) DEVICE — PADDING CAST 4 IN STERILE - 4 X 4 YDS (24/CA)

## (undated) DEVICE — PIN FIXATION BONE STERILE 3.2MM X 110MM (2EA/PK)